# Patient Record
Sex: MALE | Race: WHITE | NOT HISPANIC OR LATINO | URBAN - METROPOLITAN AREA
[De-identification: names, ages, dates, MRNs, and addresses within clinical notes are randomized per-mention and may not be internally consistent; named-entity substitution may affect disease eponyms.]

---

## 2017-12-17 ENCOUNTER — INPATIENT (INPATIENT)
Facility: HOSPITAL | Age: 72
LOS: 5 days | Discharge: HOME | DRG: 872 | End: 2017-12-23
Attending: STUDENT IN AN ORGANIZED HEALTH CARE EDUCATION/TRAINING PROGRAM | Admitting: STUDENT IN AN ORGANIZED HEALTH CARE EDUCATION/TRAINING PROGRAM
Payer: MEDICARE

## 2017-12-17 VITALS
RESPIRATION RATE: 18 BRPM | HEIGHT: 68 IN | OXYGEN SATURATION: 93 % | HEART RATE: 98 BPM | WEIGHT: 240.08 LBS | TEMPERATURE: 100 F | DIASTOLIC BLOOD PRESSURE: 93 MMHG | SYSTOLIC BLOOD PRESSURE: 146 MMHG

## 2017-12-17 DIAGNOSIS — E11.9 TYPE 2 DIABETES MELLITUS WITHOUT COMPLICATIONS: ICD-10-CM

## 2017-12-17 DIAGNOSIS — L03.90 CELLULITIS, UNSPECIFIED: ICD-10-CM

## 2017-12-17 DIAGNOSIS — F32.9 MAJOR DEPRESSIVE DISORDER, SINGLE EPISODE, UNSPECIFIED: ICD-10-CM

## 2017-12-17 DIAGNOSIS — E87.2 ACIDOSIS: ICD-10-CM

## 2017-12-17 DIAGNOSIS — N40.0 BENIGN PROSTATIC HYPERPLASIA WITHOUT LOWER URINARY TRACT SYMPTOMS: ICD-10-CM

## 2017-12-17 DIAGNOSIS — G45.9 TRANSIENT CEREBRAL ISCHEMIC ATTACK, UNSPECIFIED: ICD-10-CM

## 2017-12-17 DIAGNOSIS — Z90.49 ACQUIRED ABSENCE OF OTHER SPECIFIED PARTS OF DIGESTIVE TRACT: Chronic | ICD-10-CM

## 2017-12-17 DIAGNOSIS — A41.9 SEPSIS, UNSPECIFIED ORGANISM: ICD-10-CM

## 2017-12-17 DIAGNOSIS — Z94.84 STEM CELLS TRANSPLANT STATUS: Chronic | ICD-10-CM

## 2017-12-17 DIAGNOSIS — C90.00 MULTIPLE MYELOMA NOT HAVING ACHIEVED REMISSION: ICD-10-CM

## 2017-12-17 DIAGNOSIS — R63.8 OTHER SYMPTOMS AND SIGNS CONCERNING FOOD AND FLUID INTAKE: ICD-10-CM

## 2017-12-17 DIAGNOSIS — I10 ESSENTIAL (PRIMARY) HYPERTENSION: ICD-10-CM

## 2017-12-17 LAB
ALBUMIN SERPL ELPH-MCNC: 4.2 G/DL — SIGNIFICANT CHANGE UP (ref 3.3–5)
ALP SERPL-CCNC: 59 U/L — SIGNIFICANT CHANGE UP (ref 40–120)
ALT FLD-CCNC: 37 U/L — SIGNIFICANT CHANGE UP (ref 10–45)
ANION GAP SERPL CALC-SCNC: 16 MMOL/L — SIGNIFICANT CHANGE UP (ref 5–17)
APPEARANCE UR: CLEAR — SIGNIFICANT CHANGE UP
APTT BLD: 28.8 SEC — SIGNIFICANT CHANGE UP (ref 27.5–37.4)
AST SERPL-CCNC: 29 U/L — SIGNIFICANT CHANGE UP (ref 10–40)
BACTERIA # UR AUTO: PRESENT /HPF
BILIRUB SERPL-MCNC: 0.6 MG/DL — SIGNIFICANT CHANGE UP (ref 0.2–1.2)
BILIRUB UR-MCNC: NEGATIVE — SIGNIFICANT CHANGE UP
BUN SERPL-MCNC: 24 MG/DL — HIGH (ref 7–23)
CALCIUM SERPL-MCNC: 8.6 MG/DL — SIGNIFICANT CHANGE UP (ref 8.4–10.5)
CHLORIDE SERPL-SCNC: 97 MMOL/L — SIGNIFICANT CHANGE UP (ref 96–108)
CO2 SERPL-SCNC: 24 MMOL/L — SIGNIFICANT CHANGE UP (ref 22–31)
COLOR SPEC: YELLOW — SIGNIFICANT CHANGE UP
CREAT SERPL-MCNC: 1.12 MG/DL — SIGNIFICANT CHANGE UP (ref 0.5–1.3)
DIFF PNL FLD: (no result)
EPI CELLS # UR: SIGNIFICANT CHANGE UP /HPF (ref 0–5)
GLUCOSE BLDC GLUCOMTR-MCNC: 129 MG/DL — HIGH (ref 70–99)
GLUCOSE SERPL-MCNC: 145 MG/DL — HIGH (ref 70–99)
GLUCOSE UR QL: NEGATIVE — SIGNIFICANT CHANGE UP
HCT VFR BLD CALC: 42.7 % — SIGNIFICANT CHANGE UP (ref 39–50)
HGB BLD-MCNC: 14.3 G/DL — SIGNIFICANT CHANGE UP (ref 13–17)
INR BLD: 1 — SIGNIFICANT CHANGE UP (ref 0.88–1.16)
KETONES UR-MCNC: NEGATIVE — SIGNIFICANT CHANGE UP
LACTATE SERPL-SCNC: 1.3 MMOL/L — SIGNIFICANT CHANGE UP (ref 0.5–2)
LACTATE SERPL-SCNC: 2.3 MMOL/L — HIGH (ref 0.5–2)
LEUKOCYTE ESTERASE UR-ACNC: NEGATIVE — SIGNIFICANT CHANGE UP
LYMPHOCYTES # BLD AUTO: 1 % — LOW (ref 13–44)
MANUAL SMEAR VERIFICATION: SIGNIFICANT CHANGE UP
MCHC RBC-ENTMCNC: 30.2 PG — SIGNIFICANT CHANGE UP (ref 27–34)
MCHC RBC-ENTMCNC: 33.5 G/DL — SIGNIFICANT CHANGE UP (ref 32–36)
MCV RBC AUTO: 90.1 FL — SIGNIFICANT CHANGE UP (ref 80–100)
MONOCYTES NFR BLD AUTO: 7 % — SIGNIFICANT CHANGE UP (ref 2–14)
NEUTROPHILS NFR BLD AUTO: 83 % — HIGH (ref 43–77)
NEUTS BAND # BLD: 9 % — SIGNIFICANT CHANGE UP
NITRITE UR-MCNC: NEGATIVE — SIGNIFICANT CHANGE UP
PH UR: 6 — SIGNIFICANT CHANGE UP (ref 5–8)
PLAT MORPH BLD: NORMAL — SIGNIFICANT CHANGE UP
PLATELET # BLD AUTO: 185 K/UL — SIGNIFICANT CHANGE UP (ref 150–400)
POTASSIUM SERPL-MCNC: 4 MMOL/L — SIGNIFICANT CHANGE UP (ref 3.5–5.3)
POTASSIUM SERPL-SCNC: 4 MMOL/L — SIGNIFICANT CHANGE UP (ref 3.5–5.3)
PROT SERPL-MCNC: 7.6 G/DL — SIGNIFICANT CHANGE UP (ref 6–8.3)
PROT UR-MCNC: NEGATIVE MG/DL — SIGNIFICANT CHANGE UP
PROTHROM AB SERPL-ACNC: 11.1 SEC — SIGNIFICANT CHANGE UP (ref 9.8–12.7)
RBC # BLD: 4.74 M/UL — SIGNIFICANT CHANGE UP (ref 4.2–5.8)
RBC # FLD: 14.1 % — SIGNIFICANT CHANGE UP (ref 10.3–16.9)
RBC BLD AUTO: NORMAL — SIGNIFICANT CHANGE UP
RBC CASTS # UR COMP ASSIST: (no result) /HPF
SODIUM SERPL-SCNC: 137 MMOL/L — SIGNIFICANT CHANGE UP (ref 135–145)
SP GR SPEC: 1.01 — SIGNIFICANT CHANGE UP (ref 1–1.03)
UROBILINOGEN FLD QL: 0.2 E.U./DL — SIGNIFICANT CHANGE UP
WBC # BLD: 9.3 K/UL — SIGNIFICANT CHANGE UP (ref 3.8–10.5)
WBC # FLD AUTO: 9.3 K/UL — SIGNIFICANT CHANGE UP (ref 3.8–10.5)
WBC UR QL: < 5 /HPF — SIGNIFICANT CHANGE UP

## 2017-12-17 PROCEDURE — 99223 1ST HOSP IP/OBS HIGH 75: CPT | Mod: GC

## 2017-12-17 PROCEDURE — 93010 ELECTROCARDIOGRAM REPORT: CPT

## 2017-12-17 PROCEDURE — 99285 EMERGENCY DEPT VISIT HI MDM: CPT | Mod: 25

## 2017-12-17 PROCEDURE — 71010: CPT | Mod: 26

## 2017-12-17 RX ORDER — METOPROLOL TARTRATE 50 MG
25 TABLET ORAL
Qty: 0 | Refills: 0 | Status: DISCONTINUED | OUTPATIENT
Start: 2017-12-17 | End: 2017-12-17

## 2017-12-17 RX ORDER — PANTOPRAZOLE SODIUM 20 MG/1
40 TABLET, DELAYED RELEASE ORAL
Qty: 0 | Refills: 0 | Status: DISCONTINUED | OUTPATIENT
Start: 2017-12-17 | End: 2017-12-23

## 2017-12-17 RX ORDER — ATORVASTATIN CALCIUM 80 MG/1
1 TABLET, FILM COATED ORAL
Qty: 0 | Refills: 0 | COMMUNITY

## 2017-12-17 RX ORDER — DICLOFENAC SODIUM 30 MG/G
0 GEL TOPICAL
Qty: 0 | Refills: 0 | COMMUNITY

## 2017-12-17 RX ORDER — SODIUM CHLORIDE 9 MG/ML
1000 INJECTION INTRAMUSCULAR; INTRAVENOUS; SUBCUTANEOUS ONCE
Qty: 0 | Refills: 0 | Status: COMPLETED | OUTPATIENT
Start: 2017-12-17 | End: 2017-12-17

## 2017-12-17 RX ORDER — ASPIRIN/CALCIUM CARB/MAGNESIUM 324 MG
81 TABLET ORAL DAILY
Qty: 0 | Refills: 0 | Status: DISCONTINUED | OUTPATIENT
Start: 2017-12-17 | End: 2017-12-23

## 2017-12-17 RX ORDER — MODAFINIL 200 MG/1
0 TABLET ORAL
Qty: 0 | Refills: 0 | COMMUNITY

## 2017-12-17 RX ORDER — LISINOPRIL 2.5 MG/1
0 TABLET ORAL
Qty: 0 | Refills: 0 | COMMUNITY

## 2017-12-17 RX ORDER — TAMSULOSIN HYDROCHLORIDE 0.4 MG/1
0.4 CAPSULE ORAL AT BEDTIME
Qty: 0 | Refills: 0 | Status: DISCONTINUED | OUTPATIENT
Start: 2017-12-17 | End: 2017-12-23

## 2017-12-17 RX ORDER — SODIUM CHLORIDE 9 MG/ML
1000 INJECTION, SOLUTION INTRAVENOUS
Qty: 0 | Refills: 0 | Status: DISCONTINUED | OUTPATIENT
Start: 2017-12-17 | End: 2017-12-23

## 2017-12-17 RX ORDER — DEXTROSE 50 % IN WATER 50 %
12.5 SYRINGE (ML) INTRAVENOUS ONCE
Qty: 0 | Refills: 0 | Status: DISCONTINUED | OUTPATIENT
Start: 2017-12-17 | End: 2017-12-23

## 2017-12-17 RX ORDER — MULTIVIT-MIN/FERROUS GLUCONATE 9 MG/15 ML
0 LIQUID (ML) ORAL
Qty: 0 | Refills: 0 | COMMUNITY

## 2017-12-17 RX ORDER — METFORMIN HYDROCHLORIDE 850 MG/1
1 TABLET ORAL
Qty: 0 | Refills: 0 | COMMUNITY

## 2017-12-17 RX ORDER — OXYBUTYNIN CHLORIDE 5 MG
5 TABLET ORAL
Qty: 0 | Refills: 0 | Status: DISCONTINUED | OUTPATIENT
Start: 2017-12-17 | End: 2017-12-23

## 2017-12-17 RX ORDER — SODIUM CHLORIDE 9 MG/ML
1000 INJECTION INTRAMUSCULAR; INTRAVENOUS; SUBCUTANEOUS
Qty: 0 | Refills: 0 | Status: DISCONTINUED | OUTPATIENT
Start: 2017-12-17 | End: 2017-12-18

## 2017-12-17 RX ORDER — DULOXETINE HYDROCHLORIDE 30 MG/1
1 CAPSULE, DELAYED RELEASE ORAL
Qty: 0 | Refills: 0 | COMMUNITY

## 2017-12-17 RX ORDER — INSULIN LISPRO 100/ML
VIAL (ML) SUBCUTANEOUS
Qty: 0 | Refills: 0 | Status: DISCONTINUED | OUTPATIENT
Start: 2017-12-17 | End: 2017-12-23

## 2017-12-17 RX ORDER — ESOMEPRAZOLE MAGNESIUM 40 MG/1
1 CAPSULE, DELAYED RELEASE ORAL
Qty: 0 | Refills: 0 | COMMUNITY

## 2017-12-17 RX ORDER — ACETAMINOPHEN 500 MG
650 TABLET ORAL ONCE
Qty: 0 | Refills: 0 | Status: COMPLETED | OUTPATIENT
Start: 2017-12-17 | End: 2017-12-17

## 2017-12-17 RX ORDER — METOPROLOL TARTRATE 50 MG
25 TABLET ORAL
Qty: 0 | Refills: 0 | Status: DISCONTINUED | OUTPATIENT
Start: 2017-12-17 | End: 2017-12-23

## 2017-12-17 RX ORDER — VANCOMYCIN HCL 1 G
500 VIAL (EA) INTRAVENOUS ONCE
Qty: 0 | Refills: 0 | Status: COMPLETED | OUTPATIENT
Start: 2017-12-17 | End: 2017-12-17

## 2017-12-17 RX ORDER — ACYCLOVIR SODIUM 500 MG
400 VIAL (EA) INTRAVENOUS
Qty: 0 | Refills: 0 | Status: DISCONTINUED | OUTPATIENT
Start: 2017-12-17 | End: 2017-12-23

## 2017-12-17 RX ORDER — DEXTROSE 50 % IN WATER 50 %
25 SYRINGE (ML) INTRAVENOUS ONCE
Qty: 0 | Refills: 0 | Status: DISCONTINUED | OUTPATIENT
Start: 2017-12-17 | End: 2017-12-23

## 2017-12-17 RX ORDER — ASPIRIN/CALCIUM CARB/MAGNESIUM 324 MG
1 TABLET ORAL
Qty: 0 | Refills: 0 | COMMUNITY

## 2017-12-17 RX ORDER — CYCLOSPORINE 0.5 MG/ML
1 EMULSION OPHTHALMIC
Qty: 0 | Refills: 0 | COMMUNITY

## 2017-12-17 RX ORDER — MODAFINIL 200 MG/1
200 TABLET ORAL
Qty: 0 | Refills: 0 | Status: DISCONTINUED | OUTPATIENT
Start: 2017-12-18 | End: 2017-12-21

## 2017-12-17 RX ORDER — FLUTICASONE PROPIONATE 220 MCG
50 AEROSOL WITH ADAPTER (GRAM) INHALATION
Qty: 0 | Refills: 0 | COMMUNITY

## 2017-12-17 RX ORDER — LORATADINE 10 MG/1
0 TABLET ORAL
Qty: 0 | Refills: 0 | COMMUNITY

## 2017-12-17 RX ORDER — GLUCAGON INJECTION, SOLUTION 0.5 MG/.1ML
1 INJECTION, SOLUTION SUBCUTANEOUS ONCE
Qty: 0 | Refills: 0 | Status: DISCONTINUED | OUTPATIENT
Start: 2017-12-17 | End: 2017-12-23

## 2017-12-17 RX ORDER — DUTASTERIDE 0.5 MG/1
1 CAPSULE, LIQUID FILLED ORAL
Qty: 0 | Refills: 0 | COMMUNITY

## 2017-12-17 RX ORDER — DULOXETINE HYDROCHLORIDE 30 MG/1
60 CAPSULE, DELAYED RELEASE ORAL DAILY
Qty: 0 | Refills: 0 | Status: DISCONTINUED | OUTPATIENT
Start: 2017-12-18 | End: 2017-12-23

## 2017-12-17 RX ORDER — CLOPIDOGREL BISULFATE 75 MG/1
1 TABLET, FILM COATED ORAL
Qty: 0 | Refills: 0 | COMMUNITY

## 2017-12-17 RX ORDER — HEPARIN SODIUM 5000 [USP'U]/ML
7500 INJECTION INTRAVENOUS; SUBCUTANEOUS EVERY 8 HOURS
Qty: 0 | Refills: 0 | Status: DISCONTINUED | OUTPATIENT
Start: 2017-12-17 | End: 2017-12-23

## 2017-12-17 RX ORDER — DARIFENACIN HYDROBROMIDE 15 MG/1
1 TABLET, EXTENDED RELEASE ORAL
Qty: 0 | Refills: 0 | COMMUNITY

## 2017-12-17 RX ORDER — LISINOPRIL 2.5 MG/1
20 TABLET ORAL DAILY
Qty: 0 | Refills: 0 | Status: DISCONTINUED | OUTPATIENT
Start: 2017-12-17 | End: 2017-12-18

## 2017-12-17 RX ORDER — TAMSULOSIN HYDROCHLORIDE 0.4 MG/1
1 CAPSULE ORAL
Qty: 0 | Refills: 0 | COMMUNITY

## 2017-12-17 RX ORDER — DEXTROSE 50 % IN WATER 50 %
1 SYRINGE (ML) INTRAVENOUS ONCE
Qty: 0 | Refills: 0 | Status: DISCONTINUED | OUTPATIENT
Start: 2017-12-17 | End: 2017-12-23

## 2017-12-17 RX ORDER — FINASTERIDE 5 MG/1
5 TABLET, FILM COATED ORAL DAILY
Qty: 0 | Refills: 0 | Status: DISCONTINUED | OUTPATIENT
Start: 2017-12-18 | End: 2017-12-23

## 2017-12-17 RX ORDER — VANCOMYCIN HCL 1 G
1000 VIAL (EA) INTRAVENOUS ONCE
Qty: 0 | Refills: 0 | Status: COMPLETED | OUTPATIENT
Start: 2017-12-17 | End: 2017-12-17

## 2017-12-17 RX ORDER — ATORVASTATIN CALCIUM 80 MG/1
40 TABLET, FILM COATED ORAL AT BEDTIME
Qty: 0 | Refills: 0 | Status: DISCONTINUED | OUTPATIENT
Start: 2017-12-17 | End: 2017-12-23

## 2017-12-17 RX ORDER — CLOPIDOGREL BISULFATE 75 MG/1
75 TABLET, FILM COATED ORAL DAILY
Qty: 0 | Refills: 0 | Status: DISCONTINUED | OUTPATIENT
Start: 2017-12-17 | End: 2017-12-23

## 2017-12-17 RX ORDER — ACYCLOVIR SODIUM 500 MG
1 VIAL (EA) INTRAVENOUS
Qty: 0 | Refills: 0 | COMMUNITY

## 2017-12-17 RX ORDER — METOPROLOL TARTRATE 50 MG
1 TABLET ORAL
Qty: 0 | Refills: 0 | COMMUNITY

## 2017-12-17 RX ORDER — VANCOMYCIN HCL 1 G
1500 VIAL (EA) INTRAVENOUS EVERY 24 HOURS
Qty: 0 | Refills: 0 | Status: DISCONTINUED | OUTPATIENT
Start: 2017-12-18 | End: 2017-12-21

## 2017-12-17 RX ORDER — MIRABEGRON 50 MG/1
1 TABLET, EXTENDED RELEASE ORAL
Qty: 0 | Refills: 0 | COMMUNITY

## 2017-12-17 RX ADMIN — Medication 200 MILLIGRAM(S): at 23:00

## 2017-12-17 RX ADMIN — Medication 400 MILLIGRAM(S): at 23:00

## 2017-12-17 RX ADMIN — SODIUM CHLORIDE 1000 MILLILITER(S): 9 INJECTION INTRAMUSCULAR; INTRAVENOUS; SUBCUTANEOUS at 19:10

## 2017-12-17 RX ADMIN — Medication 250 MILLIGRAM(S): at 15:58

## 2017-12-17 RX ADMIN — Medication 100 MILLIGRAM(S): at 22:23

## 2017-12-17 RX ADMIN — TAMSULOSIN HYDROCHLORIDE 0.4 MILLIGRAM(S): 0.4 CAPSULE ORAL at 22:23

## 2017-12-17 RX ADMIN — SODIUM CHLORIDE 2000 MILLILITER(S): 9 INJECTION INTRAMUSCULAR; INTRAVENOUS; SUBCUTANEOUS at 20:44

## 2017-12-17 RX ADMIN — SODIUM CHLORIDE 1000 MILLILITER(S): 9 INJECTION INTRAMUSCULAR; INTRAVENOUS; SUBCUTANEOUS at 15:58

## 2017-12-17 RX ADMIN — ATORVASTATIN CALCIUM 40 MILLIGRAM(S): 80 TABLET, FILM COATED ORAL at 22:23

## 2017-12-17 RX ADMIN — Medication 650 MILLIGRAM(S): at 19:10

## 2017-12-17 NOTE — H&P ADULT - NSHPLABSRESULTS_GEN_ALL_CORE
.  LABS:                         14.3   9.3   )-----------( 185      ( 17 Dec 2017 15:31 )             42.7     12-17    137  |  97  |  24<H>  ----------------------------<  145<H>  4.0   |  24  |  1.12    Ca    8.6      17 Dec 2017 15:31    TPro  7.6  /  Alb  4.2  /  TBili  0.6  /  DBili  x   /  AST  29  /  ALT  37  /  AlkPhos  59  12-17    PT/INR - ( 17 Dec 2017 15:31 )   PT: 11.1 sec;   INR: 1.00          PTT - ( 17 Dec 2017 15:31 )  PTT:28.8 sec      Lactate, Blood: 2.3 mmoL/L (12-17 @ 15:32)    RADIOLOGY, EKG & ADDITIONAL TESTS: Reviewed.

## 2017-12-17 NOTE — PATIENT PROFILE ADULT. - VISION (WITH CORRECTIVE LENSES IF THE PATIENT USUALLY WEARS THEM):
Partially impaired: cannot see medication labels or newsprint, but can see obstacles in path, and the surrounding layout; can count fingers at arm's length/Double vision
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
## 2017-12-17 NOTE — H&P ADULT - PROBLEM SELECTOR PLAN 4
Pt w/ h/o MM s/p chemo >12 years ago. Before treatment, immunosuppressed infections including PCP. Currently followed in [place] at [facility].   - c/w Acyclovir 400mg BID  - continue to monitor

## 2017-12-17 NOTE — H&P ADULT - PROBLEM SELECTOR PLAN 6
Patient with reported HTN at home, on MTP 25mg BID, triamterene HCTZ 37.5/25mg daily. sBP 141-162. Last dose taken this AM.   - c/w MTP 25mg BID  - c/w lisinopril 20mg BID    # HLD   - c/w atorvostatin 40mg   - Patient with reported HTN at home, on MTP 25mg BID, triamterene HCTZ 37.5/25mg daily. sBP 141-162. Last dose taken this AM.   - c/w MTP 25mg BID  - c/w lisinopril 20mg BID    # HLD   - c/w atorvastatin 40mg Patient with reported HTN at home, on MTP 25mg BID, triamterene HCTZ 37.5/25mg daily. sBP 141-162. Last dose taken this AM.   - c/w MTP 25mg BID  - c/w lisinopril 20mg BID  -holding hctz-triamterene in setting of dry MM   # HLD   - c/w atorvastatin 40mg

## 2017-12-17 NOTE — H&P ADULT - PROBLEM SELECTOR PLAN 9
Known BPH.  - c/w Tamsulosin 0.4mg, Dutasteride 0.5mg PO daily     # urge incontinence: home darifenacin ER 7.5mg PO d and Myrbetriq ER 25mg held Known BPH.  - c/w Tamsulosin 0.4mg, Dutasteride 0.5mg PO daily     # urge incontinence: c/w home darifenacin ER 7.5mg PO d and Myrbetriq ER 25mg

## 2017-12-17 NOTE — H&P ADULT - PROBLEM SELECTOR PLAN 1
Pt w/ sepsis criteria with T 101.6F HR 93 Lactate 2.3 likely 2/2 to cellulitis, pt w/o h/o MRSA cellulitis c/b sepsis. Blood Cultures collected, pending. Given recent cough, CX for r/o PNA.   - s/p Vanc 1g ED - c/w Abx  - IVF resuscitation with goal 30cc/Kg  - f/u CXR, UA, BCx  - Tylenol 650mg PRN for T>38C  - continue to monitor Pt w/ sepsis criteria with T 101.6F HR 93 Lactate 2.3 likely 2/2 to cellulitis, pt w/o h/o MRSA cellulitis c/b sepsis. Blood Cultures collected, pending. Given recent cough, CX for r/o PNA.   - s/p Vanc 1g ED - given weight based dosing, give 500mg IVP now and then plan for Vanc 1.5mg IVP q24 with trough before 4th dose - ensure to run slowly with concern for red man syndrome.   - IVF resuscitation with goal 30cc/Kg  - f/u CXR, UA, BCx  - Tylenol 650mg PRN for T>38C  - continue to monitor

## 2017-12-17 NOTE — H&P ADULT - PROBLEM SELECTOR PLAN 5
Pt with h/o TIA in the past; w/u per pt and family was negative, no atrial fibrillation, PFO, CAD, stenosis - unknown cause, attributed to MM. No h/o stroke or residual deficits.   - c/w ASA 81mg and Plavix 75mg POd

## 2017-12-17 NOTE — ED PROVIDER NOTE - OBJECTIVE STATEMENT
73 y/o m with extensive PMH including multiple myeloma s/p stem cell transplant x 2 (last 2000), HTN, DM, presents to ED with weakness, subjective fever, chills, inability to stand secondary to weakness and left leg redness.  Pt has hx of multiple prior cellulitis infections requiring antibiotics and admission (pt from [place]).  No chest pain, shortness of breath.  No trauma.

## 2017-12-17 NOTE — H&P ADULT - NSHPREVIEWOFSYSTEMS_GEN_ALL_CORE
CONSTITUTIONAL: weakness, fevers or chills SEE HPI  EYES/ENT: No visual changes;  No vertigo or throat pain   NECK: No pain or stiffness  RESPIRATORY: + cough, - wheezing, hemoptysis; No shortness of breath  CARDIOVASCULAR: No chest pain or palpitations  GASTROINTESTINAL: No abdominal or epigastric pain. + nausea, vomiting, or hematemesis; No diarrhea or constipation. No melena or hematochezia.  GENITOURINARY: No dysuria, frequency or hematuria  NEUROLOGICAL: No numbness or weakness  SKIN: No itching, burning, rashes, or lesions   All other review of systems is negative unless indicated above.

## 2017-12-17 NOTE — H&P ADULT - PROBLEM SELECTOR PLAN 8
Patient with DMII (reported A1c of 6) but c/b diabetic neuropathy. Home metformin HCl 500mg BID held on admission  - Insulin sliding scale  - Consistent carb diabetic diet Patient with DMII (reported A1c of 6) but c/b diabetic neuropathy. Home metformin HCl 500mg BID held on admission  - Insulin sliding scale  - Consistent carb diabetic diet    # Obstructive Sleep Apnea (EDELMIRA): pt w/ CPAP at night  - CPAP

## 2017-12-17 NOTE — ED ADULT NURSE NOTE - OBJECTIVE STATEMENT
71 y/o male c/o lightheadedness, weakness, general malaise, nausea, vomiting, and left ankle pain since last night. pt recently traveled from [facility] by plane yesterday. denies chest pain, sob. PMH multiple myeloma, TIA's, DM. NAD, VSS, EKG done. left ankle appears red, inflamed, hot to touch, tracking up the left calf. pt has battery in his back for neuropathy.

## 2017-12-17 NOTE — H&P ADULT - NSHPPHYSICALEXAM_GEN_ALL_CORE
.  VITAL SIGNS:  T(F): 101.6 (12-17-17 @ 18:22), Max: 101.6 (12-17-17 @ 18:22)  HR: 93 (12-17-17 @ 18:22) (93 - 98)  BP: 162/90 (12-17-17 @ 18:22) (141/78 - 162/90)  BP(mean): --  RR: 18 (12-17-17 @ 18:22) (18 - 18)  SpO2: 95% (12-17-17 @ 18:22) (93% - 96%)    PHYSICAL EXAM:    Constitutional: NAD, patient lying in bed comfortable  HEENT: NC/AT, PERRL, EOMI, anicteric sclera, no nasal discharge; uvula midline, no oropharyngeal erythema or exudates; dry MM  Neck: Supple; no JVD  Respiratory: CTA B/L; no W/R/R, no retractions. Clear to auscultation bilateral.   Cardiac: +S1/S2; RRR; no M/R/G; PMI non-displaced  Gastrointestinal: soft, NT/ND; no rebound or guarding; +BSx4. Well healed surgical scar. no suprapubic tenderness  Extremities: WWP, bilaterally, there are multiple abrasions, poorly healed lesions. The L LE has one 5cm, nonpurulent, scabbed cut with surrounding erythema, extending up to below the knee, non-tender to palpation but warm to touch. decreased sensation in both LE bilaterally. Limited ROM due to weakness (3/5).  Vascular: 2+ radial, DP/PT pulses B/L  Neurologic: AAOx3; CNII-XII grossly intact; no focal deficits

## 2017-12-17 NOTE — H&P ADULT - ATTENDING COMMENTS
patient seen and examined  reviewed vs, labs, CXR  agree w/ PE findings as above w/ additions/ exceptions/ pertinent findings: pt is morbidly obese, dry MM, unable to assess JVD to neck girth; there is a tender abrasion at the LLExt above the lateral malleolus, w/ surrounding tender erythema, superior and medial circumferential borders marked as it is difficult to differentiate erythema from rest of skin on the lateral and inferior aspect of wound ; no drainage from wound; no calf tenderness b/l ; pt w/ dry feet b/l    1. left lower ext cellulitis/ severe  sepsis : c/w weight based vanco, run slowly to avoid red man syndrome  2.   3.   4. patient seen and examined  reviewed vs, labs, CXR    pt traveling to [place] from [place] to celebrate relative's birthday; pt arrived to [facility] on 12/16 to fly back to [place] on 12/26. sustained abrasion at left lower ext , w/ progressing erythema and tenderness now w/ systemic signs ; reports runny nose and cough since flight.     agree w/ PE findings as above w/ additions/ exceptions/ pertinent findings: pt is morbidly obese, dry MM, unable to assess JVD to neck girth; there is a tender abrasion at the LLExt above the lateral malleolus, w/ surrounding tender erythema, superior and medial circumferential borders marked as it is difficult to differentiate erythema from rest of skin on the lateral and inferior aspect of wound ; no drainage from wound; no calf tenderness b/l ; pt w/ dry feet b/l    1. left lower ext cellulitis/ severe  sepsis : c/w weight based vanco, run slowly (hx of red man syndrome); IVFs overnight, monitor UO  2. HTN : c/w metoprolol and lisinopril as pt w/ elevated BP, w/ hold parameters; holding Triamterene-hctz in setting of patient appearing dry on exam  3. cough w/ runny nose: checking RVP   4. DM: plan as above   5. Heparin SQ PPX

## 2017-12-17 NOTE — H&P ADULT - HISTORY OF PRESENT ILLNESS
72yM with PMH of MM s/p chemo (>15yo), DM (A1c6) c/b peripheral neuropathy MRSA cellulitis, TIA x2 on ASA/Plx, HTN, HLD, BPH w/ urge incontinence presenting from home with complaints of worsening L LE swelling, redness, pain and generalized weakness. Pt and spouse, primary care taker, traveling from [place] 2/2 to [facility] 2 days ago. At that time, spouse noted non-healing, well demarcated cut which "happens all the time." Topical bacitracin, bandage applied, + clear drainage. Denies fever, chills, systemic symptoms. Yesterday, pt began o fell "generally sick," described as generalized weakness, tiredness, nausea and had one episode of vomiting this AM w/ noted worsening of redness around L LE, similar to prior cellulitis episodes. Pt has been hospitalized >5x for cellulitis, on number occasions MRSA. Pt sees PCP and podiatrist in [place] regularly for DM foot care and maintenance. Pt felt "feverish" this AM but did not take anything for it-- the worsening presentation of LE cellulitis motivated them to present to ED. Of note, pt endorsed non-productive cough x2days. Denies chest pain, palpitations, occasional HA, No SOB, AYALA, able to ambulate w/ assistance at baseline. No new numbness or focal neurologic symptoms, no difficulty word finding or speaking.     In the ED: T37.6 HR 98 146/93 RR 18 (93%). Patient given 1L NS and 1g Vanc IV. Shortly after admission while in [facility], patient spike a fever T 101.6 (o) 162/90 HR 93 RR 18 (95%) on RA with elevated Lactate 2.3,, meeting sepsis criteria. 2 additional L were ordered, including CXR, UA, BCx were collected on admission. Patient was transferred to [facility] for further management.

## 2017-12-17 NOTE — H&P ADULT - PMH
Anxiety    Arthritis    BPH (benign prostatic hyperplasia)    Depression    Diabetes    Hyperlipidemia    Hypertension    Multiple myeloma  s/p chemotherapy  Skin abnormalities    TIA (transient ischemic attack)

## 2017-12-17 NOTE — ED PROVIDER NOTE - MEDICAL DECISION MAKING DETAILS
pt with erythema to left foot with hx of DM and immunocompromised state (MM with stem cell transplant) - pt also weak - elevated LA on labs otherwise ok, pt with cellulitis to left foot with lymphangitis, has had multiple prior admissions for cellulitis in [place] because of hx of worsening cellulitis on oral antibiotics

## 2017-12-17 NOTE — H&P ADULT - PROBLEM SELECTOR PLAN 7
Patient with h/o depression on duloxetine 60mg BID, Lyrica 100mg TID, duloxetine 60mg BID  - c/w home medications and continue to monitor    #GERD: h/o GERD controlled with esomeprazole 40mg  - c/w pantoprazole 40mg PO d Patient with h/o depression on duloxetine 60mg BID, Lyrica 100mg TID  - c/w home medications and continue to monitor  - c/w modafinil 200mg BID daily     #GERD: h/o GERD controlled with esomeprazole 40mg  - c/w pantoprazole 40mg PO d

## 2017-12-17 NOTE — H&P ADULT - PROBLEM SELECTOR PLAN 2
Pt with clinical h/o MRSA cellulitis c/b sepsis >5 times. Pt w/ DM neuropathy and multiple lesions on the LE. S/S of L LE cellulitis on exam, non-purulent.   - c/w Vanc  - continue to monitor and consider wound consult Pt with clinical h/o MRSA cellulitis c/b sepsis >5 times. Pt w/ DM neuropathy and multiple lesions on the LE. S/S of L LE cellulitis on exam, non-purulent.   - s/p Vanc 1g ED - given weight based dosing, give 500mg IVP now and then plan for Vanc 1.5mg IVP q24 with trough before 4th dose - ensure to run slowly with concern for red man syndrome  - continue to monitor and consider wound consult

## 2017-12-17 NOTE — H&P ADULT - ASSESSMENT
2yM with PMH of MM s/p chemo (>15yo), DM (A1c6) c/b peripheral neuropathy MRSA cellulitis, TIA x2 on ASA/Plx, HTN, HLD, BPH w/ urge incontinence presenting from home with complaints of worsening L LE swelling, redness, pain and generalized weakness with concern for cellutlitis. Patient was became septic and being treated with IV Abx (Vanc) and fluid resuscitation. 72yM with PMH of MM s/p chemo (>13yo), DM (A1c6) c/b peripheral neuropathy MRSA cellulitis, TIA x2 on ASA/Plx, HTN, HLD, BPH w/ urge incontinence presenting from home with complaints of worsening L LE swelling, redness, pain and generalized weakness with concern for cellutlitis. Patient became septic and being treated with IV Abx (Vanc) and fluid resuscitation.

## 2017-12-18 DIAGNOSIS — N40.1 BENIGN PROSTATIC HYPERPLASIA WITH LOWER URINARY TRACT SYMPTOMS: ICD-10-CM

## 2017-12-18 DIAGNOSIS — I10 ESSENTIAL (PRIMARY) HYPERTENSION: ICD-10-CM

## 2017-12-18 DIAGNOSIS — E11.40 TYPE 2 DIABETES MELLITUS WITH DIABETIC NEUROPATHY, UNSPECIFIED: ICD-10-CM

## 2017-12-18 DIAGNOSIS — Z85.79 PERSONAL HISTORY OF OTHER MALIGNANT NEOPLASMS OF LYMPHOID, HEMATOPOIETIC AND RELATED TISSUES: ICD-10-CM

## 2017-12-18 DIAGNOSIS — G47.33 OBSTRUCTIVE SLEEP APNEA (ADULT) (PEDIATRIC): ICD-10-CM

## 2017-12-18 DIAGNOSIS — D69.6 THROMBOCYTOPENIA, UNSPECIFIED: ICD-10-CM

## 2017-12-18 DIAGNOSIS — E87.6 HYPOKALEMIA: ICD-10-CM

## 2017-12-18 DIAGNOSIS — Z86.73 PERSONAL HISTORY OF TRANSIENT ISCHEMIC ATTACK (TIA), AND CEREBRAL INFARCTION WITHOUT RESIDUAL DEFICITS: ICD-10-CM

## 2017-12-18 DIAGNOSIS — G89.29 OTHER CHRONIC PAIN: ICD-10-CM

## 2017-12-18 LAB
ANION GAP SERPL CALC-SCNC: 12 MMOL/L — SIGNIFICANT CHANGE UP (ref 5–17)
BUN SERPL-MCNC: 17 MG/DL — SIGNIFICANT CHANGE UP (ref 7–23)
CALCIUM SERPL-MCNC: 7.7 MG/DL — LOW (ref 8.4–10.5)
CHLORIDE SERPL-SCNC: 101 MMOL/L — SIGNIFICANT CHANGE UP (ref 96–108)
CO2 SERPL-SCNC: 22 MMOL/L — SIGNIFICANT CHANGE UP (ref 22–31)
CREAT SERPL-MCNC: 1.04 MG/DL — SIGNIFICANT CHANGE UP (ref 0.5–1.3)
CULTURE RESULTS: SIGNIFICANT CHANGE UP
GLUCOSE BLDC GLUCOMTR-MCNC: 120 MG/DL — HIGH (ref 70–99)
GLUCOSE BLDC GLUCOMTR-MCNC: 137 MG/DL — HIGH (ref 70–99)
GLUCOSE BLDC GLUCOMTR-MCNC: 159 MG/DL — HIGH (ref 70–99)
GLUCOSE BLDC GLUCOMTR-MCNC: 173 MG/DL — HIGH (ref 70–99)
GLUCOSE SERPL-MCNC: 129 MG/DL — HIGH (ref 70–99)
HBA1C BLD-MCNC: 6.2 % — HIGH (ref 4–5.6)
HCT VFR BLD CALC: 38.3 % — LOW (ref 39–50)
HGB BLD-MCNC: 12.4 G/DL — LOW (ref 13–17)
MAGNESIUM SERPL-MCNC: 1.8 MG/DL — SIGNIFICANT CHANGE UP (ref 1.6–2.6)
MCHC RBC-ENTMCNC: 29.7 PG — SIGNIFICANT CHANGE UP (ref 27–34)
MCHC RBC-ENTMCNC: 32.4 G/DL — SIGNIFICANT CHANGE UP (ref 32–36)
MCV RBC AUTO: 91.8 FL — SIGNIFICANT CHANGE UP (ref 80–100)
PLATELET # BLD AUTO: 142 K/UL — LOW (ref 150–400)
POTASSIUM SERPL-MCNC: 3.4 MMOL/L — LOW (ref 3.5–5.3)
POTASSIUM SERPL-SCNC: 3.4 MMOL/L — LOW (ref 3.5–5.3)
RAPID RVP RESULT: SIGNIFICANT CHANGE UP
RBC # BLD: 4.17 M/UL — LOW (ref 4.2–5.8)
RBC # FLD: 14.4 % — SIGNIFICANT CHANGE UP (ref 10.3–16.9)
SODIUM SERPL-SCNC: 135 MMOL/L — SIGNIFICANT CHANGE UP (ref 135–145)
SPECIMEN SOURCE: SIGNIFICANT CHANGE UP
WBC # BLD: 4.8 K/UL — SIGNIFICANT CHANGE UP (ref 3.8–10.5)
WBC # FLD AUTO: 4.8 K/UL — SIGNIFICANT CHANGE UP (ref 3.8–10.5)

## 2017-12-18 PROCEDURE — 99233 SBSQ HOSP IP/OBS HIGH 50: CPT

## 2017-12-18 RX ORDER — POTASSIUM CHLORIDE 20 MEQ
40 PACKET (EA) ORAL ONCE
Qty: 0 | Refills: 0 | Status: COMPLETED | OUTPATIENT
Start: 2017-12-18 | End: 2017-12-18

## 2017-12-18 RX ORDER — LISINOPRIL 2.5 MG/1
20 TABLET ORAL
Qty: 0 | Refills: 0 | Status: DISCONTINUED | OUTPATIENT
Start: 2017-12-18 | End: 2017-12-23

## 2017-12-18 RX ORDER — MAGNESIUM SULFATE 500 MG/ML
2 VIAL (ML) INJECTION ONCE
Qty: 0 | Refills: 0 | Status: COMPLETED | OUTPATIENT
Start: 2017-12-18 | End: 2017-12-18

## 2017-12-18 RX ORDER — TRIAMTERENE/HYDROCHLOROTHIAZID 75 MG-50MG
1 TABLET ORAL DAILY
Qty: 0 | Refills: 0 | Status: DISCONTINUED | OUTPATIENT
Start: 2017-12-18 | End: 2017-12-23

## 2017-12-18 RX ORDER — ACETAMINOPHEN 500 MG
650 TABLET ORAL ONCE
Qty: 0 | Refills: 0 | Status: COMPLETED | OUTPATIENT
Start: 2017-12-18 | End: 2017-12-18

## 2017-12-18 RX ADMIN — CLOPIDOGREL BISULFATE 75 MILLIGRAM(S): 75 TABLET, FILM COATED ORAL at 11:55

## 2017-12-18 RX ADMIN — LISINOPRIL 20 MILLIGRAM(S): 2.5 TABLET ORAL at 07:41

## 2017-12-18 RX ADMIN — ATORVASTATIN CALCIUM 40 MILLIGRAM(S): 80 TABLET, FILM COATED ORAL at 22:56

## 2017-12-18 RX ADMIN — Medication 650 MILLIGRAM(S): at 22:56

## 2017-12-18 RX ADMIN — Medication 25 MILLIGRAM(S): at 18:00

## 2017-12-18 RX ADMIN — Medication 200 MILLIGRAM(S): at 22:56

## 2017-12-18 RX ADMIN — HEPARIN SODIUM 7500 UNIT(S): 5000 INJECTION INTRAVENOUS; SUBCUTANEOUS at 07:41

## 2017-12-18 RX ADMIN — Medication 50 GRAM(S): at 11:54

## 2017-12-18 RX ADMIN — DULOXETINE HYDROCHLORIDE 60 MILLIGRAM(S): 30 CAPSULE, DELAYED RELEASE ORAL at 11:56

## 2017-12-18 RX ADMIN — Medication 1 CAPSULE(S): at 12:49

## 2017-12-18 RX ADMIN — MODAFINIL 200 MILLIGRAM(S): 200 TABLET ORAL at 19:35

## 2017-12-18 RX ADMIN — Medication 1: at 12:44

## 2017-12-18 RX ADMIN — Medication 300 MILLIGRAM(S): at 20:47

## 2017-12-18 RX ADMIN — Medication 400 MILLIGRAM(S): at 18:00

## 2017-12-18 RX ADMIN — FINASTERIDE 5 MILLIGRAM(S): 5 TABLET, FILM COATED ORAL at 11:56

## 2017-12-18 RX ADMIN — HEPARIN SODIUM 7500 UNIT(S): 5000 INJECTION INTRAVENOUS; SUBCUTANEOUS at 22:57

## 2017-12-18 RX ADMIN — Medication 400 MILLIGRAM(S): at 07:41

## 2017-12-18 RX ADMIN — Medication 25 MILLIGRAM(S): at 07:41

## 2017-12-18 RX ADMIN — HEPARIN SODIUM 7500 UNIT(S): 5000 INJECTION INTRAVENOUS; SUBCUTANEOUS at 13:12

## 2017-12-18 RX ADMIN — MODAFINIL 200 MILLIGRAM(S): 200 TABLET ORAL at 07:41

## 2017-12-18 RX ADMIN — Medication 5 MILLIGRAM(S): at 07:41

## 2017-12-18 RX ADMIN — Medication 100 MILLIGRAM(S): at 12:43

## 2017-12-18 RX ADMIN — Medication 81 MILLIGRAM(S): at 11:56

## 2017-12-18 RX ADMIN — Medication 5 MILLIGRAM(S): at 18:00

## 2017-12-18 RX ADMIN — Medication 40 MILLIEQUIVALENT(S): at 11:57

## 2017-12-18 RX ADMIN — PANTOPRAZOLE SODIUM 40 MILLIGRAM(S): 20 TABLET, DELAYED RELEASE ORAL at 07:41

## 2017-12-18 RX ADMIN — TAMSULOSIN HYDROCHLORIDE 0.4 MILLIGRAM(S): 0.4 CAPSULE ORAL at 22:56

## 2017-12-18 RX ADMIN — LISINOPRIL 20 MILLIGRAM(S): 2.5 TABLET ORAL at 18:00

## 2017-12-18 NOTE — DISCHARGE NOTE ADULT - MEDICATION SUMMARY - MEDICATIONS TO TAKE
I will START or STAY ON the medications listed below when I get home from the hospital:    dutasteride 0.5 mg oral capsule  -- 1 cap(s) by mouth once a day  -- Indication: For BPH (benign prostatic hyperplasia)    Aspir 81 oral delayed release tablet  -- 1 tab(s) by mouth once a day  -- Indication: For prevention    lisinopril 20 mg oral tablet  -- orally 2 times a day  -- Indication: For HTN    tamsulosin 0.4 mg oral capsule  -- 1 cap(s) by mouth once a day  -- Indication: For BPH (benign prostatic hyperplasia)    Lyrica 100 mg oral capsule  -- orally 3 times a day  -- Indication: For NEUROPATHY    DULoxetine 60 mg oral delayed release capsule  -- 1 cap(s) by mouth once a day  -- Indication: For Depression    metFORMIN 500 mg oral tablet  -- 1 tab(s) by mouth 2 times a day  -- Indication: For Diabetes    Claritin  -- Indication: For Allergies    atorvastatin 40 mg oral tablet  -- 1 tab(s) by mouth once a day  -- Indication: For HLD    triamterene-hydrochlorothiazide 37.5mg-25mg oral capsule  -- 1 cap(s) by mouth once a day  -- Indication: For HTN    clopidogrel 75 mg oral tablet  -- 1 tab(s) by mouth once a day  -- Indication: For prevention    acyclovir 400 mg oral tablet  -- 1 tab(s) by mouth 2 times a day  -- Indication: For Multiple myeloma    metoprolol tartrate 25 mg oral tablet  -- 1 tab(s) by mouth 2 times a day  -- Indication: For HTN    amLODIPine 5 mg oral tablet  -- 1 tab(s) by mouth once a day  -- Indication: For HTN    modafinil 200 mg oral tablet  -- orally 2 times a day  -- Indication: For ALERTNESS    Pennsaid 2% topical solution  -- Indication: For Skin abnormalities    Restasis 0.05% ophthalmic emulsion  -- 1 drop(s) to each affected eye every 12 hours  -- Indication: For Eye abnormality    esomeprazole 40 mg oral delayed release capsule  -- 1 cap(s) by mouth once a day  -- Indication: For gerd    Levaquin 750 mg oral tablet  -- 1 tab(s) by mouth once a day   -- Avoid prolonged or excessive exposure to direct and/or artificial sunlight while taking this medication.  Do not take dairy products, antacids, or iron preparations within one hour of this medication.  Finish all this medication unless otherwise directed by prescriber.  May cause drowsiness or dizziness.  Medication should be taken with plenty of water.    -- Indication: For Bacteremia    fluticasone propionate  -- 50 milligram(s) inhaled  -- Indication: For Allergies    Myrbetriq 25 mg oral tablet, extended release  -- 1 tab(s) by mouth once a day  -- Indication: For Spastic bladder    darifenacin 7.5 mg oral tablet, extended release  -- 1 tab(s) by mouth once a day  -- Indication: For Spastic bladder    Centrum  -- Indication: For prevention

## 2017-12-18 NOTE — PROGRESS NOTE ADULT - PROBLEM SELECTOR PLAN 6
Patient with reported HTN at home, on MTP 25mg BID, triamterene HCTZ 37.5/25mg daily. sBP 141-162. Last dose taken this AM.   - c/w MTP 25mg BID  - c/w lisinopril 20mg BID  - holding hctz-triamterene in setting of dry MM     # HLD   - c/w atorvastatin 40mg

## 2017-12-18 NOTE — PROGRESS NOTE ADULT - PROBLEM SELECTOR PLAN 2
Pt with clinical h/o MRSA cellulitis c/b sepsis >5 times. Pt w/ DM neuropathy and multiple lesions on the LE. S/S of L LE cellulitis on exam, non-purulent.   - c/w Vanc 1.5g IVP q24 with trough before 4th dose - ensure to run slowly with concern for red man syndrome.   - continue to monitor and consider wound consult

## 2017-12-18 NOTE — DISCHARGE NOTE ADULT - PATIENT PORTAL LINK FT
“You can access the [facility] Patient Portal, offered by [facility], by registering with the following website: [email or web address]”

## 2017-12-18 NOTE — DISCHARGE NOTE ADULT - SECONDARY DIAGNOSIS.
Severe sepsis TIA (transient ischemic attack) Hypertension BPH (benign prostatic hyperplasia) Multiple myeloma Cellulitis of left lower extremity

## 2017-12-18 NOTE — PROGRESS NOTE ADULT - PROBLEM SELECTOR PLAN 3
Patient with Lactate of 2.3 on admission, since RESOLVED (1.3) s/p 3L Normal Saline. Normal anion gap.

## 2017-12-18 NOTE — PROGRESS NOTE ADULT - PROBLEM SELECTOR PLAN 1
Pt w/ severe sepsis criteria (since resolved) with T 101.6F HR 93 Lactate 2.3 likely 2/2 to cellulitis, pt w/ h/o MRSA cellulitis c/b sepsis. Blood Cultures collected, pending. CXR no acute infil UA negative BCx: NGTD  - c/w Vanc 1.5g IVP q24 with trough before 4th dose - ensure to run slowly with concern for red man syndrome.   - s/p IVF resuscitation with goal 30cc/Kg, now on 100cc/Hr in setting decreased PO; consider discontinuing maintenance once improved PO intake.   - Tylenol 650mg PRN for T>38C  - continue to monitor

## 2017-12-18 NOTE — PROGRESS NOTE ADULT - SUBJECTIVE AND OBJECTIVE BOX
OVERNIGHT EVENTS: SHARON    SUBJECTIVE / INTERVAL HPI: Patient seen and examined at bedside.     VITAL SIGNS:  Vital Signs Last 24 Hrs  T(C): 36.7 (18 Dec 2017 05:00), Max: 38.7 (17 Dec 2017 18:22)  T(F): 98.1 (18 Dec 2017 05:00), Max: 101.6 (17 Dec 2017 18:22)  HR: 93 (18 Dec 2017 06:58) (86 - 98)  BP: 122/73 (18 Dec 2017 05:00) (119/72 - 162/90)  BP(mean): 95 (17 Dec 2017 20:01) (95 - 95)  RR: 18 (18 Dec 2017 06:58) (18 - 20)  SpO2: 95% (18 Dec 2017 06:58) (93% - 98%)    PHYSICAL EXAM:    General: WDWN  HEENT: NC/AT; PERRL, anicteric sclera; MMM  Neck: supple  Cardiovascular: +S1/S2; RRR  Respiratory: CTA B/L; no W/R/R  Gastrointestinal: soft, NT/ND; +BSx4  Extremities: WWP; no edema, clubbing or cyanosis  Vascular: 2+ radial, DP/PT pulses B/L  Neurological: AAOx3; no focal deficits    MEDICATIONS:  MEDICATIONS  (STANDING):  acyclovir   Tablet 400 milliGRAM(s) Oral two times a day  aspirin enteric coated 81 milliGRAM(s) Oral daily  atorvastatin 40 milliGRAM(s) Oral at bedtime  clopidogrel Tablet 75 milliGRAM(s) Oral daily  dextrose 5%. 1000 milliLiter(s) (50 mL/Hr) IV Continuous <Continuous>  dextrose 50% Injectable 12.5 Gram(s) IV Push once  dextrose 50% Injectable 25 Gram(s) IV Push once  dextrose 50% Injectable 25 Gram(s) IV Push once  DULoxetine 60 milliGRAM(s) Oral daily  finasteride 5 milliGRAM(s) Oral daily  heparin  Injectable 7500 Unit(s) SubCutaneous every 8 hours  insulin lispro (HumaLOG) corrective regimen sliding scale   SubCutaneous Before meals and at bedtime  lisinopril 20 milliGRAM(s) Oral daily  metoprolol     tartrate 25 milliGRAM(s) Oral two times a day  modafinil 200 milliGRAM(s) Oral two times a day  oxybutynin 5 milliGRAM(s) Oral two times a day  pantoprazole    Tablet 40 milliGRAM(s) Oral before breakfast  pregabalin 100 milliGRAM(s) Oral daily  pregabalin 200 milliGRAM(s) Oral at bedtime  sodium chloride 0.9%. 1000 milliLiter(s) (100 mL/Hr) IV Continuous <Continuous>  tamsulosin 0.4 milliGRAM(s) Oral at bedtime  vancomycin  IVPB 1500 milliGRAM(s) IV Intermittent every 24 hours    MEDICATIONS  (PRN):  dextrose Gel 1 Dose(s) Oral once PRN Blood Glucose LESS THAN 70 milliGRAM(s)/deciliter  glucagon  Injectable 1 milliGRAM(s) IntraMuscular once PRN Glucose LESS THAN 70 milligrams/deciliter      ALLERGIES:  Allergies    Bactrim (Headache)  Sular (Other)    Intolerances    Cipro (Headache)  vancomycin (Red [place] Synd)      LABS:                        14.3   9.3   )-----------( 185      ( 17 Dec 2017 15:31 )             42.7     12-    137  |  97  |  24<H>  ----------------------------<  145<H>  4.0   |  24  |  1.12    Ca    8.6      17 Dec 2017 15:31    TPro  7.6  /  Alb  4.2  /  TBili  0.6  /  DBili  x   /  AST  29  /  ALT  37  /  AlkPhos  59  12-17    PT/INR - ( 17 Dec 2017 15:31 )   PT: 11.1 sec;   INR: 1.00          PTT - ( 17 Dec 2017 15:31 )  PTT:28.8 sec  Urinalysis Basic - ( 17 Dec 2017 20:08 )    Color: Yellow / Appearance: Clear / S.010 / pH: x  Gluc: x / Ketone: NEGATIVE  / Bili: Negative / Urobili: 0.2 E.U./dL   Blood: x / Protein: NEGATIVE mg/dL / Nitrite: NEGATIVE   Leuk Esterase: NEGATIVE / RBC: 5-10 /HPF / WBC < 5 /HPF   Sq Epi: x / Non Sq Epi: 0-5 /HPF / Bacteria: Present /HPF      CAPILLARY BLOOD GLUCOSE      POCT Blood Glucose.: 129 mg/dL (17 Dec 2017 22:13)      RADIOLOGY & ADDITIONAL TESTS: Reviewed.    ASSESSMENT:    PLAN: OVERNIGHT EVENTS: SHARON    SUBJECTIVE / INTERVAL HPI: Patient seen and examined at bedside.   Patient w/o complaints, patient able to sleep well with CPAP. unable to note improvement in erythema, pain.   Pt incontinent to urine overnight   Patient without CP, palpitations, SOB, AYALA, Abd pain, NVD.      VITAL SIGNS:  Vital Signs Last 24 Hrs  T(C): 36.7 (18 Dec 2017 05:00), Max: 38.7 (17 Dec 2017 18:22)  T(F): 98.1 (18 Dec 2017 05:00), Max: 101.6 (17 Dec 2017 18:22)  HR: 93 (18 Dec 2017 06:58) (86 - 98)  BP: 122/73 (18 Dec 2017 05:00) (119/72 - 162/90)  BP(mean): 95 (17 Dec 2017 20:01) (95 - 95)  RR: 18 (18 Dec 2017 06:58) (18 - 20)  SpO2: 95% (18 Dec 2017 06:58) (93% - 98%)    PHYSICAL EXAM:    General: NAD, lying in bed comfortably, [id number]  HEENT: NC/AT; PERRL, anicteric sclera; MMM  Neck: supple, no JVD  Cardiovascular: +S1/S2; RRR  Respiratory: CTA B/L; no W/R/R  Gastrointestinal: soft, NT/ND; +BSx4, protuberant   Extremities: WWP; no edema, clubbing or cyanosis. LLE: one 5cm, nonpurulent, scabbed cut with surrounding erythema- unchanged since yesterday. Area erythema improved, non-tender to palpation but warm to touch. Decreased sensation in both LE bilaterally. Limited ROM due to weakness (3/5).  Vascular: 2+ radial, DP/PT pulses B/L  Neurological: AAOx3; no focal deficits    MEDICATIONS:  MEDICATIONS  (STANDING):  acyclovir   Tablet 400 milliGRAM(s) Oral two times a day  aspirin enteric coated 81 milliGRAM(s) Oral daily  atorvastatin 40 milliGRAM(s) Oral at bedtime  clopidogrel Tablet 75 milliGRAM(s) Oral daily  dextrose 5%. 1000 milliLiter(s) (50 mL/Hr) IV Continuous <Continuous>  dextrose 50% Injectable 12.5 Gram(s) IV Push once  dextrose 50% Injectable 25 Gram(s) IV Push once  dextrose 50% Injectable 25 Gram(s) IV Push once  DULoxetine 60 milliGRAM(s) Oral daily  finasteride 5 milliGRAM(s) Oral daily  heparin  Injectable 7500 Unit(s) SubCutaneous every 8 hours  insulin lispro (HumaLOG) corrective regimen sliding scale   SubCutaneous Before meals and at bedtime  lisinopril 20 milliGRAM(s) Oral daily  metoprolol     tartrate 25 milliGRAM(s) Oral two times a day  modafinil 200 milliGRAM(s) Oral two times a day  oxybutynin 5 milliGRAM(s) Oral two times a day  pantoprazole    Tablet 40 milliGRAM(s) Oral before breakfast  pregabalin 100 milliGRAM(s) Oral daily  pregabalin 200 milliGRAM(s) Oral at bedtime  sodium chloride 0.9%. 1000 milliLiter(s) (100 mL/Hr) IV Continuous <Continuous>  tamsulosin 0.4 milliGRAM(s) Oral at bedtime  vancomycin  IVPB 1500 milliGRAM(s) IV Intermittent every 24 hours    MEDICATIONS  (PRN):  dextrose Gel 1 Dose(s) Oral once PRN Blood Glucose LESS THAN 70 milliGRAM(s)/deciliter  glucagon  Injectable 1 milliGRAM(s) IntraMuscular once PRN Glucose LESS THAN 70 milligrams/deciliter      ALLERGIES:  Allergies    Bactrim (Headache)  Sular (Other)    Intolerances    Cipro (Headache)  vancomycin (Red Man Synd)      LABS:                        14.3   9.3   )-----------( 185      ( 17 Dec 2017 15:31 )             42.7     12-    137  |  97  |  24<H>  ----------------------------<  145<H>  4.0   |  24  |  1.12    Ca    8.6      17 Dec 2017 15:31    TPro  7.6  /  Alb  4.2  /  TBili  0.6  /  DBili  x   /  AST  29  /  ALT  37  /  AlkPhos  59  12-17    PT/INR - ( 17 Dec 2017 15:31 )   PT: 11.1 sec;   INR: 1.00          PTT - ( 17 Dec 2017 15:31 )  PTT:28.8 sec  Urinalysis Basic - ( 17 Dec 2017 20:08 )    Color: Yellow / Appearance: Clear / S.010 / pH: x  Gluc: x / Ketone: NEGATIVE  / Bili: Negative / Urobili: 0.2 E.U./dL   Blood: x / Protein: NEGATIVE mg/dL / Nitrite: NEGATIVE   Leuk Esterase: NEGATIVE / RBC: 5-10 /HPF / WBC < 5 /HPF   Sq Epi: x / Non Sq Epi: 0-5 /HPF / Bacteria: Present /HPF      CAPILLARY BLOOD GLUCOSE      POCT Blood Glucose.: 129 mg/dL (17 Dec 2017 22:13)      RADIOLOGY & ADDITIONAL TESTS: Reviewed.

## 2017-12-18 NOTE — DISCHARGE NOTE ADULT - VISION (WITH CORRECTIVE LENSES IF THE PATIENT USUALLY WEARS THEM):
Double vision/Partially impaired: cannot see medication labels or newsprint, but can see obstacles in path, and the surrounding layout; can count fingers at arm's length

## 2017-12-18 NOTE — DISCHARGE NOTE ADULT - CARE PROVIDER_API CALL
Devan Galdamez  [address] [place] [id number]  Phone: [phone]  Fax: (       -

## 2017-12-18 NOTE — PROGRESS NOTE ADULT - SUBJECTIVE AND OBJECTIVE BOX
Patient is a 72y old  Male who presents with a chief complaint of Cellulitis of the lower leg (18 Dec 2017 12:02)      INTERVAL HPI/OVERNIGHT EVENTS:    Pt. seen and examined at 11AM  Pt.'s wife at bedside  Pt. reports improvement in LLE redness, pain, warmth, and swelling  Fever and N/V resolved; no chills    Review of Systems: 12 point review of systems otherwise negative    MEDICATIONS  (STANDING):  acyclovir   Tablet 400 milliGRAM(s) Oral two times a day  aspirin enteric coated 81 milliGRAM(s) Oral daily  atorvastatin 40 milliGRAM(s) Oral at bedtime  clopidogrel Tablet 75 milliGRAM(s) Oral daily  dextrose 5%. 1000 milliLiter(s) (50 mL/Hr) IV Continuous <Continuous>  dextrose 50% Injectable 12.5 Gram(s) IV Push once  dextrose 50% Injectable 25 Gram(s) IV Push once  dextrose 50% Injectable 25 Gram(s) IV Push once  DULoxetine 60 milliGRAM(s) Oral daily  finasteride 5 milliGRAM(s) Oral daily  heparin  Injectable 7500 Unit(s) SubCutaneous every 8 hours  insulin lispro (HumaLOG) corrective regimen sliding scale   SubCutaneous Before meals and at bedtime  lisinopril 20 milliGRAM(s) Oral two times a day  metoprolol     tartrate 25 milliGRAM(s) Oral two times a day  modafinil 200 milliGRAM(s) Oral two times a day  oxybutynin 5 milliGRAM(s) Oral two times a day  pantoprazole    Tablet 40 milliGRAM(s) Oral before breakfast  pregabalin 100 milliGRAM(s) Oral daily  pregabalin 200 milliGRAM(s) Oral at bedtime  tamsulosin 0.4 milliGRAM(s) Oral at bedtime  triamterene 37.5 mG/hydrochlorothiazide 25 mG Capsule 1 Capsule(s) Oral daily  vancomycin  IVPB 1500 milliGRAM(s) IV Intermittent every 24 hours    MEDICATIONS  (PRN):  dextrose Gel 1 Dose(s) Oral once PRN Blood Glucose LESS THAN 70 milliGRAM(s)/deciliter  glucagon  Injectable 1 milliGRAM(s) IntraMuscular once PRN Glucose LESS THAN 70 milligrams/deciliter      Allergies    Bactrim (Headache)  Sular (Other)    Intolerances    Cipro (Headache)  vancomycin (Red Man Synd)        Vital Signs Last 24 Hrs  T(C): 37.9 (18 Dec 2017 10:11), Max: 38.7 (17 Dec 2017 18:22)  T(F): 100.3 (18 Dec 2017 10:11), Max: 101.6 (17 Dec 2017 18:22)  HR: 84 (18 Dec 2017 09:09) (84 - 98)  BP: 172/73 (18 Dec 2017 10:11) (119/72 - 172/73)  BP(mean): 95 (17 Dec 2017 20:01) (95 - 95)  RR: 18 (18 Dec 2017 09:09) (18 - 20)  SpO2: 97% (18 Dec 2017 09:09) (93% - 98%)  CAPILLARY BLOOD GLUCOSE      POCT Blood Glucose.: 159 mg/dL (18 Dec 2017 12:17)  POCT Blood Glucose.: 120 mg/dL (18 Dec 2017 08:00)  POCT Blood Glucose.: 129 mg/dL (17 Dec 2017 22:13)  POCT Blood Glucose.: 161 mg/dL (17 Dec 2017 14:31)       @ 07:01  -   @ 07:00  --------------------------------------------------------  IN: 2000 mL / OUT: 875 mL / NET: 1125 mL        Physical Exam:  (at 11AM)  Daily Height in cm: 172.72 (17 Dec 2017 20:01)    Daily Weight in k (17 Dec 2017 20:01)  General:  non-toxic and well-appearing  HEENT:  MMM  Abdomen:  obese  Extremities:  LLE w/ improved erythema, edema, warmth, and tenderness; no pus or fluctuance   Skin:  WWP  :  +[place] cath  Neuro:  AAOx3    LABS:                        12.4   4.8   )-----------( 142      ( 18 Dec 2017 08:18 )             38.3     1218    135  |  101  |  17  ----------------------------<  129<H>  3.4<L>   |  22  |  1.04    Ca    7.7<L>      18 Dec 2017 08:18  Mg     1.8     12-18    TPro  7.6  /  Alb  4.2  /  TBili  0.6  /  DBili  x   /  AST  29  /  ALT  37  /  AlkPhos  59  12-17    PT/INR - ( 17 Dec 2017 15:31 )   PT: 11.1 sec;   INR: 1.00          PTT - ( 17 Dec 2017 15:31 )  PTT:28.8 sec  Urinalysis Basic - ( 17 Dec 2017 20:08 )    Color: Yellow / Appearance: Clear / S.010 / pH: x  Gluc: x / Ketone: NEGATIVE  / Bili: Negative / Urobili: 0.2 E.U./dL   Blood: x / Protein: NEGATIVE mg/dL / Nitrite: NEGATIVE   Leuk Esterase: NEGATIVE / RBC: 5-10 /HPF / WBC < 5 /HPF   Sq Epi: x / Non Sq Epi: 0-5 /HPF / Bacteria: Present /HPF          RADIOLOGY & ADDITIONAL TESTS:    ---------------------------------------------------------------------------  I personally reviewed: [  ]EKG   [  ]CXR    [  ] CT    [  ]Other  ---------------------------------------------------------------------------  PLEASE CHECK WHEN PRESENT:     [  ]Heart Failure     [  ] Acute     [  ] Acute on Chronic     [  ] Chronic  -------------------------------------------------------------------     [  ]Diastolic [HFpEF]     [  ]Systolic [HFrEF]     [  ]Combined [HFpEF & HFrEF]     [  ]Other:  -------------------------------------------------------------------  [  ]ANNE     [  ]ATN     [  ]Reneal Medullary Necrosis     [  ]Renal Cortical Necrosis     [  ]Other Pathological Lesions:    [  ]CKD 1  [  ]CKD 2  [  ]CKD 3  [  ]CKD 4  [  ]CKD 5  [  ]Other  -------------------------------------------------------------------  [  ]Other/Unspecified:    --------------------------------------------------------------------    Abdominal Nutritional Status  [  ]Malnutrition: See Nutrition Note  [  ]Cachexia  [  ]Other:   [  ]Supplement Ordered:  [  ]Morbid Obesity (BMI >=40]

## 2017-12-18 NOTE — DISCHARGE NOTE ADULT - PROVIDER TOKENS
FREE:[LAST:[Rudolph],FIRST:[Devan],PHONE:[phone]],FAX:[(   [phone]    -],ADDRESS:[[address] [place], [place] [id number]]]

## 2017-12-18 NOTE — PROGRESS NOTE ADULT - PROBLEM SELECTOR PLAN 1
POA, d/t LLE cellulitis; clinically-improved; cont. IV Vanc fow now (day #2/5+; infusing slowly, given h/o hypersensitivity), elevate leg, f/u cultures; lactic acidosis resolved; recurrent, h/o MRSA SSTIs, has outpatient Podiatry f/u for preventative care

## 2017-12-18 NOTE — PROGRESS NOTE ADULT - PROBLEM SELECTOR PLAN 8
Patient with DMII (reported A1c of 6) but c/b diabetic neuropathy. Home metformin HCl 500mg BID held on admission. -160  - Insulin sliding scale  - Consistent carb diabetic diet    # Obstructive Sleep Apnea (EDELMIRA): pt w/ CPAP at night  - CPAP O/N Patient with DMII (A1c of 6.2) but c/b diabetic neuropathy. Home metformin HCl 500mg BID held on admission. -160  - Insulin sliding scale  - Consistent carb diabetic diet    # Obstructive Sleep Apnea (EDELMIRA): pt w/ CPAP at night  - CPAP O/N

## 2017-12-18 NOTE — PROGRESS NOTE ADULT - ASSESSMENT
72yM with PMH of MM s/p chemo (>15yo), DM (A1c6) c/b peripheral neuropathy MRSA cellulitis, TIA x2 on ASA/Plx, HTN, HLD, BPH w/ urge incontinence, Chronic BP with DRG stimulator presented from home with c/p of worsening L LE swelling, redness, pain and generalized weakness a/w cellulitis, c/b severe sepsis (since resolved) currently on IV Abx (Vanc)

## 2017-12-18 NOTE — DISCHARGE NOTE ADULT - PLAN OF CARE
Treatment Plan While you admitted for evaluation of your cellulitis, one of the four blood cultures bottles grew bacteria. Given the type of bacteria, you were evaluated for concern for endocarditis, a condition in which a vegetation or collection of bacteria is present on one of your heart valves. You had a echocardiogram, which showed XXXX. You were placed on antibiotics on admission, which was later changed. Please continue to take your antibiotic XXXX for another XXX. You were found to have a cellulitis, an infection of your skin, on admission. You were given IV fluids and antibiotics, with significant improvement in your symptoms. On admission, you were found to be septic. You were provided appropriate interventions of IV fluids, antibiotics and close monitoring. You continued to improve throughout your hospital course. Please continue to take your medications, Aspirin and Plavix, as prescribed and follow up with your outpatient primary care provider. You were hypertensive throughout your hospitalization and were started on Norvasc 5mg. Please continue to take your medications as prescribed and follow up with your outpatient primary care provider. While you admitted for evaluation of your cellulitis, one of the four blood cultures bottles grew bacteria. Given the type of bacteria, you were evaluated for concern for endocarditis, a condition in which a vegetation or collection of bacteria is present on one of your heart valves. You had a echocardiogram, which showed no vegetations. You have been on  antibiotics on admission. Please continue to take your antibiotic Levaquin for another 8 days and follow up with your primary care provider 1 week after discharge from the hospital.

## 2017-12-18 NOTE — DISCHARGE NOTE ADULT - CARE PLAN
Principal Discharge DX:	Cellulitis of left lower extremity  Secondary Diagnosis:	Severe sepsis  Secondary Diagnosis:	TIA (transient ischemic attack)  Secondary Diagnosis:	Hypertension  Secondary Diagnosis:	BPH (benign prostatic hyperplasia)  Secondary Diagnosis:	Multiple myeloma Principal Discharge DX:	Bacteremia  Goal:	Treatment Plan  Instructions for follow-up, activity and diet:	While you admitted for evaluation of your cellulitis, one of the four blood cultures bottles grew bacteria. Given the type of bacteria, you were evaluated for concern for endocarditis, a condition in which a vegetation or collection of bacteria is present on one of your heart valves. You had a echocardiogram, which showed XXXX. You were placed on antibiotics on admission, which was later changed. Please continue to take your antibiotic XXXX for another XXX.  Secondary Diagnosis:	Cellulitis of left lower extremity  Instructions for follow-up, activity and diet:	You were found to have a cellulitis, an infection of your skin, on admission. You were given IV fluids and antibiotics, with significant improvement in your symptoms.  Secondary Diagnosis:	Severe sepsis  Instructions for follow-up, activity and diet:	On admission, you were found to be septic. You were provided appropriate interventions of IV fluids, antibiotics and close monitoring. You continued to improve throughout your hospital course.  Secondary Diagnosis:	TIA (transient ischemic attack)  Instructions for follow-up, activity and diet:	Please continue to take your medications, Aspirin and Plavix, as prescribed and follow up with your outpatient primary care provider.  Secondary Diagnosis:	Hypertension  Instructions for follow-up, activity and diet:	You were hypertensive throughout your hospitalization and were started on Norvasc 5mg. Please continue to take your medications as prescribed and follow up with your outpatient primary care provider. Principal Discharge DX:	Bacteremia  Goal:	Treatment Plan  Instructions for follow-up, activity and diet:	While you admitted for evaluation of your cellulitis, one of the four blood cultures bottles grew bacteria. Given the type of bacteria, you were evaluated for concern for endocarditis, a condition in which a vegetation or collection of bacteria is present on one of your heart valves. You had a echocardiogram, which showed no vegetations. You have been on  antibiotics on admission. Please continue to take your antibiotic Levaquin for another 8 days and follow up with your primary care provider 1 week after discharge from the hospital.  Secondary Diagnosis:	Cellulitis of left lower extremity  Instructions for follow-up, activity and diet:	You were found to have a cellulitis, an infection of your skin, on admission. You were given IV fluids and antibiotics, with significant improvement in your symptoms.  Secondary Diagnosis:	Severe sepsis  Instructions for follow-up, activity and diet:	On admission, you were found to be septic. You were provided appropriate interventions of IV fluids, antibiotics and close monitoring. You continued to improve throughout your hospital course.  Secondary Diagnosis:	TIA (transient ischemic attack)  Instructions for follow-up, activity and diet:	Please continue to take your medications, Aspirin and Plavix, as prescribed and follow up with your outpatient primary care provider.  Secondary Diagnosis:	Hypertension  Instructions for follow-up, activity and diet:	You were hypertensive throughout your hospitalization and were started on Norvasc 5mg. Please continue to take your medications as prescribed and follow up with your outpatient primary care provider.

## 2017-12-18 NOTE — DISCHARGE NOTE ADULT - HOSPITAL COURSE
72yM with PMH of MM s/p chemo (>13yo), DM (A1c6) c/b peripheral neuropathy MRSA cellulitis, TIA x2 on ASA/Plx, HTN, HLD, BPH w/ urge incontinence presenting from home with complaints of worsening L LE swelling, redness, pain and generalized weakness - symptoms consistent with prior episodes of cellulitis. In the ED: T37.6 HR 98 146/93 RR 18 (93%). Patient given 1L NS and 1g Vanc IV. Shortly after admission, patient spiked and met severe sepsis criteria with an elevated lactate 2.3; CXR and UA negative, Bcx negative. Patient was adequately fluid resuscitated (30cc/Kg) and repeat lactate WNL. On exam, there was a non-purulent abrasion at LLE above alteral malleolus, with surround erythema. Patient was transferred to [facility] with admission dx of cellulitis c/b severe sepsis. Patient was continue on IV vancomycin 1.5g q24h with notable improvement in L LE cellulitis and systemic symptoms. Patient was restarted on all home medications (extensive list) with clinical improvement, with plan for discharge on Keflex + doxy for another XXX days. Of note, patient is visiting from [place] and will be traveling day of discharge to [place]. A detailed discussion regarding follow up care and adverse medications reactions took place and instructions to report to ED/Urgent Care with any concerns for above- patient and spouse demonstrated understanding. Patient is hemodynamically stable and safe for discharge with plan for outpatient follow up in [place] upon their return December 26. 72yM with PMH of MM s/p chemo (>15yo), DM (A1c6) c/b peripheral neuropathy MRSA cellulitis, TIA x2 on ASA/Plx, HTN, HLD, BPH w/ urge incontinence presenting from home with complaints of worsening L LE swelling, redness, pain and generalized weakness - symptoms consistent with prior episodes of cellulitis. In the ED: T37.6 HR 98 146/93 RR 18 (93%). Patient given 1L NS and 1g Vanc IV. Shortly after admission, patient spiked and met severe sepsis criteria with an elevated lactate 2.3; CXR and UA negative, Bcx negative. Patient was adequately fluid resuscitated (30cc/Kg) and repeat lactate WNL. On exam, there was a non-purulent abrasion at LLE above alteral malleolus, with surround erythema. Patient was transferred to [facility] [place] with admission dx of cellulitis c/b severe sepsis. Patient was continue on IV vancomycin 1.5g q24h with notable improvement in L LE cellulitis and systemic symptoms. Patient was restarted on all home medications (extensive list) with clinical improvement. 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 Abx was switched from Vancomycin to ceftriaxone. Given the concern for endocarditis a patient had a TTE, which could not rule out vegetations On 12/22, patient underwent a ESTELLA, which demonstrated [id number].       Of note, patient is visiting from [place]; a detailed discussion regarding follow up care and adverse medication reactions took place and instructions to report to ED/Urgent Care with any concerns patient and spouse demonstrated understanding. Patient is hemodynamically stable and safe for discharge with plan for outpatient follow up in [place] upon their return December 26. 72yM with PMH of MM s/p chemo (>15yo), DM (A1c6) c/b peripheral neuropathy MRSA cellulitis, TIA x2 on ASA/Plx, HTN, HLD, BPH w/ urge incontinence presenting from home with complaints of worsening L LE swelling, redness, pain and generalized weakness - symptoms consistent with prior episodes of cellulitis. In the ED: T37.6 HR 98 146/93 RR 18 (93%). Patient given 1L NS and 1g Vanc IV. Shortly after admission, patient spiked and met severe sepsis criteria with an elevated lactate 2.3; CXR and UA negative, Bcx negative. Patient was adequately fluid resuscitated (30cc/Kg) and repeat lactate WNL. On exam, there was a non-purulent abrasion at LLE above alteral malleolus, with surround erythema. Patient was transferred to [facility] with admission dx of cellulitis c/b severe sepsis. Patient was continue on IV vancomycin 1.5g q24h with notable improvement in L LE cellulitis and systemic symptoms. Patient was restarted on all home medications (extensive list) with clinical improvement. 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 Abx was switched from Vancomycin to ceftriaxone. Given the concern for endocarditis a patient had a TTE, which could not rule out vegetations but on 12/22, patient underwent a ESTELLA, which demonstrated no vegetations. Per ID, patient may be discharged with course of Levaquin for 14 day from last negative culture (although ceftriaxone was preferred but given social situation and need for travel back to [place], PO option was preferred by patient). Of note, patient is visiting from [place]; a detailed discussion regarding follow up care and adverse medication reactions took place and instructions to report to ED/Urgent Care with any concerns, patient and spouse demonstrated understanding. Patient is hemodynamically stable and safe for discharge with plan for outpatient follow up in [place] upon their return December 26.

## 2017-12-18 NOTE — DISCHARGE NOTE ADULT - ADDITIONAL INSTRUCTIONS
Follow up with your PCP within one week.  Continue taking your Levaquin as prescribed.  Return to an ER with any new or worsening symptoms.

## 2017-12-19 DIAGNOSIS — R78.81 BACTEREMIA: ICD-10-CM

## 2017-12-19 LAB
-  CANDIDA ALBICANS: SIGNIFICANT CHANGE UP
-  CANDIDA GLABRATA: SIGNIFICANT CHANGE UP
-  CANDIDA KRUSEI: SIGNIFICANT CHANGE UP
-  CANDIDA PARAPSILOSIS: SIGNIFICANT CHANGE UP
-  CANDIDA TROPICALIS: SIGNIFICANT CHANGE UP
-  COAGULASE NEGATIVE STAPHYLOCOCCUS: SIGNIFICANT CHANGE UP
-  K. PNEUMONIAE GROUP: SIGNIFICANT CHANGE UP
-  KPC RESISTANCE GENE: SIGNIFICANT CHANGE UP
-  STREPTOCOCCUS SP. (NOT GRP A, B OR S PNEUMONIAE): SIGNIFICANT CHANGE UP
A BAUMANNII DNA SPEC QL NAA+PROBE: SIGNIFICANT CHANGE UP
ANION GAP SERPL CALC-SCNC: 14 MMOL/L — SIGNIFICANT CHANGE UP (ref 5–17)
BASOPHILS NFR BLD AUTO: 0.2 % — SIGNIFICANT CHANGE UP (ref 0–2)
BUN SERPL-MCNC: 15 MG/DL — SIGNIFICANT CHANGE UP (ref 7–23)
CALCIUM SERPL-MCNC: 8.5 MG/DL — SIGNIFICANT CHANGE UP (ref 8.4–10.5)
CHLORIDE SERPL-SCNC: 101 MMOL/L — SIGNIFICANT CHANGE UP (ref 96–108)
CO2 SERPL-SCNC: 24 MMOL/L — SIGNIFICANT CHANGE UP (ref 22–31)
CREAT SERPL-MCNC: 1.06 MG/DL — SIGNIFICANT CHANGE UP (ref 0.5–1.3)
E CLOAC COMP DNA BLD POS QL NAA+PROBE: SIGNIFICANT CHANGE UP
E COLI DNA BLD POS QL NAA+NON-PROBE: SIGNIFICANT CHANGE UP
ENTEROCOC DNA BLD POS QL NAA+NON-PROBE: SIGNIFICANT CHANGE UP
ENTEROCOC DNA BLD POS QL NAA+NON-PROBE: SIGNIFICANT CHANGE UP
EOSINOPHIL NFR BLD AUTO: 1.5 % — SIGNIFICANT CHANGE UP (ref 0–6)
GLUCOSE BLDC GLUCOMTR-MCNC: 117 MG/DL — HIGH (ref 70–99)
GLUCOSE BLDC GLUCOMTR-MCNC: 125 MG/DL — HIGH (ref 70–99)
GLUCOSE BLDC GLUCOMTR-MCNC: 137 MG/DL — HIGH (ref 70–99)
GLUCOSE BLDC GLUCOMTR-MCNC: 156 MG/DL — HIGH (ref 70–99)
GLUCOSE SERPL-MCNC: 124 MG/DL — HIGH (ref 70–99)
GP B STREP DNA BLD POS QL NAA+NON-PROBE: SIGNIFICANT CHANGE UP
GRAM STN FLD: SIGNIFICANT CHANGE UP
HAEM INFLU DNA BLD POS QL NAA+NON-PROBE: SIGNIFICANT CHANGE UP
HCT VFR BLD CALC: 39 % — SIGNIFICANT CHANGE UP (ref 39–50)
HGB BLD-MCNC: 12.7 G/DL — LOW (ref 13–17)
K OXYTOCA DNA BLD POS QL NAA+NON-PROBE: SIGNIFICANT CHANGE UP
L MONOCYTOG DNA BLD POS QL NAA+NON-PROBE: SIGNIFICANT CHANGE UP
LYMPHOCYTES # BLD AUTO: 21.6 % — SIGNIFICANT CHANGE UP (ref 13–44)
MAGNESIUM SERPL-MCNC: 2.4 MG/DL — SIGNIFICANT CHANGE UP (ref 1.6–2.6)
MCHC RBC-ENTMCNC: 29.5 PG — SIGNIFICANT CHANGE UP (ref 27–34)
MCHC RBC-ENTMCNC: 32.6 G/DL — SIGNIFICANT CHANGE UP (ref 32–36)
MCV RBC AUTO: 90.7 FL — SIGNIFICANT CHANGE UP (ref 80–100)
METHOD TYPE: SIGNIFICANT CHANGE UP
MONOCYTES NFR BLD AUTO: 14.4 % — HIGH (ref 2–14)
MRSA SPEC QL CULT: SIGNIFICANT CHANGE UP
MSSA DNA SPEC QL NAA+PROBE: SIGNIFICANT CHANGE UP
N MEN ISLT CULT: SIGNIFICANT CHANGE UP
NEUTROPHILS NFR BLD AUTO: 62.3 % — SIGNIFICANT CHANGE UP (ref 43–77)
P AERUGINOSA DNA BLD POS NAA+NON-PROBE: SIGNIFICANT CHANGE UP
PLATELET # BLD AUTO: 157 K/UL — SIGNIFICANT CHANGE UP (ref 150–400)
POTASSIUM SERPL-MCNC: 3.8 MMOL/L — SIGNIFICANT CHANGE UP (ref 3.5–5.3)
POTASSIUM SERPL-SCNC: 3.8 MMOL/L — SIGNIFICANT CHANGE UP (ref 3.5–5.3)
PROTEUS SP DNA BLD POS QL NAA+NON-PROBE: SIGNIFICANT CHANGE UP
RBC # BLD: 4.3 M/UL — SIGNIFICANT CHANGE UP (ref 4.2–5.8)
RBC # FLD: 14.5 % — SIGNIFICANT CHANGE UP (ref 10.3–16.9)
S MARCESCENS DNA BLD POS NAA+NON-PROBE: SIGNIFICANT CHANGE UP
S PNEUM DNA BLD POS QL NAA+NON-PROBE: SIGNIFICANT CHANGE UP
S PYO DNA BLD POS QL NAA+NON-PROBE: SIGNIFICANT CHANGE UP
SODIUM SERPL-SCNC: 139 MMOL/L — SIGNIFICANT CHANGE UP (ref 135–145)
WBC # BLD: 6 K/UL — SIGNIFICANT CHANGE UP (ref 3.8–10.5)
WBC # FLD AUTO: 6 K/UL — SIGNIFICANT CHANGE UP (ref 3.8–10.5)

## 2017-12-19 PROCEDURE — 99233 SBSQ HOSP IP/OBS HIGH 50: CPT | Mod: GC

## 2017-12-19 PROCEDURE — 70450 CT HEAD/BRAIN W/O DYE: CPT | Mod: 26

## 2017-12-19 PROCEDURE — 99222 1ST HOSP IP/OBS MODERATE 55: CPT | Mod: GC

## 2017-12-19 RX ORDER — POTASSIUM CHLORIDE 20 MEQ
20 PACKET (EA) ORAL ONCE
Qty: 0 | Refills: 0 | Status: COMPLETED | OUTPATIENT
Start: 2017-12-19 | End: 2017-12-19

## 2017-12-19 RX ORDER — ACETAMINOPHEN 500 MG
650 TABLET ORAL ONCE
Qty: 0 | Refills: 0 | Status: COMPLETED | OUTPATIENT
Start: 2017-12-19 | End: 2017-12-19

## 2017-12-19 RX ADMIN — CLOPIDOGREL BISULFATE 75 MILLIGRAM(S): 75 TABLET, FILM COATED ORAL at 11:55

## 2017-12-19 RX ADMIN — Medication 25 MILLIGRAM(S): at 17:37

## 2017-12-19 RX ADMIN — MODAFINIL 200 MILLIGRAM(S): 200 TABLET ORAL at 08:55

## 2017-12-19 RX ADMIN — HEPARIN SODIUM 7500 UNIT(S): 5000 INJECTION INTRAVENOUS; SUBCUTANEOUS at 23:01

## 2017-12-19 RX ADMIN — LISINOPRIL 20 MILLIGRAM(S): 2.5 TABLET ORAL at 06:49

## 2017-12-19 RX ADMIN — Medication 100 MILLIGRAM(S): at 11:56

## 2017-12-19 RX ADMIN — Medication 400 MILLIGRAM(S): at 06:49

## 2017-12-19 RX ADMIN — MODAFINIL 200 MILLIGRAM(S): 200 TABLET ORAL at 17:36

## 2017-12-19 RX ADMIN — HEPARIN SODIUM 7500 UNIT(S): 5000 INJECTION INTRAVENOUS; SUBCUTANEOUS at 14:45

## 2017-12-19 RX ADMIN — FINASTERIDE 5 MILLIGRAM(S): 5 TABLET, FILM COATED ORAL at 11:55

## 2017-12-19 RX ADMIN — Medication 1: at 23:02

## 2017-12-19 RX ADMIN — Medication 300 MILLIGRAM(S): at 19:22

## 2017-12-19 RX ADMIN — LISINOPRIL 20 MILLIGRAM(S): 2.5 TABLET ORAL at 17:36

## 2017-12-19 RX ADMIN — Medication 25 MILLIGRAM(S): at 06:49

## 2017-12-19 RX ADMIN — HEPARIN SODIUM 7500 UNIT(S): 5000 INJECTION INTRAVENOUS; SUBCUTANEOUS at 06:49

## 2017-12-19 RX ADMIN — ATORVASTATIN CALCIUM 40 MILLIGRAM(S): 80 TABLET, FILM COATED ORAL at 23:02

## 2017-12-19 RX ADMIN — Medication 1 CAPSULE(S): at 07:48

## 2017-12-19 RX ADMIN — DULOXETINE HYDROCHLORIDE 60 MILLIGRAM(S): 30 CAPSULE, DELAYED RELEASE ORAL at 11:55

## 2017-12-19 RX ADMIN — TAMSULOSIN HYDROCHLORIDE 0.4 MILLIGRAM(S): 0.4 CAPSULE ORAL at 23:02

## 2017-12-19 RX ADMIN — Medication 5 MILLIGRAM(S): at 06:49

## 2017-12-19 RX ADMIN — Medication 81 MILLIGRAM(S): at 11:55

## 2017-12-19 RX ADMIN — Medication 5 MILLIGRAM(S): at 17:37

## 2017-12-19 RX ADMIN — Medication 200 MILLIGRAM(S): at 23:02

## 2017-12-19 RX ADMIN — Medication 20 MILLIEQUIVALENT(S): at 11:55

## 2017-12-19 RX ADMIN — Medication 400 MILLIGRAM(S): at 17:36

## 2017-12-19 RX ADMIN — Medication 650 MILLIGRAM(S): at 23:02

## 2017-12-19 RX ADMIN — PANTOPRAZOLE SODIUM 40 MILLIGRAM(S): 20 TABLET, DELAYED RELEASE ORAL at 06:49

## 2017-12-19 NOTE — CONSULT NOTE ADULT - SUBJECTIVE AND OBJECTIVE BOX
HPI:  72yM with PMH of MM s/p chemo (>15yo), DM (A1c6) c/b peripheral neuropathy MRSA cellulitis, TIA x2 on ASA/Plx, HTN, HLD, BPH w/ urge incontinence presenting from home with complaints of worsening L LE swelling, redness, pain and generalized weakness. Pt and spouse, primary care taker, traveling from [place] 2 to [facility] 2 days ago. At that time, spouse noted non-healing, well demarcated cut which "happens all the time." Topical bacitracin, bandage applied, + clear drainage. Denies fever, chills, systemic symptoms. Yesterday, pt began o fell "generally sick," described as generalized weakness, tiredness, nausea and had one episode of vomiting this AM w/ noted worsening of redness around L LE, similar to prior cellulitis episodes. Pt has been hospitalized >5x for cellulitis, on number occasions MRSA. Pt sees PCP and podiatrist in [place] regularly for DM foot care and maintenance. Pt felt "feverish" this AM but did not take anything for it-- the worsening presentation of LE cellulitis motivated them to present to ED. Of note, pt endorsed non-productive cough x2days. Denies chest pain, palpitations, occasional HA, No SOB, AYALA, able to ambulate w/ assistance at baseline. No new numbness or focal neurologic symptoms, no difficulty word finding or speaking.     In the ED: T37.6 HR 98 146/93 RR 18 (93%). Patient given 1L NS and 1g Vanc IV. Shortly after admission while in [facility], patient spike a fever T 101.6 (o) 162/90 HR 93 RR 18 (95%) on RA with elevated Lactate 2.3,, meeting sepsis criteria. 2 additional L were ordered, including CXR, UA, BCx were collected on admission. Patient was transferred to [facility] for further management. (17 Dec 2017 19:05)    INTERVAL HPI/OVERNIGHT EVENTS: Patient seen and examined at bedside. Patient iterates above story, feels he is getting better and that the erythema around his LLE wound is improving. Patient still with intermittent fever, chills, no cough, no SOB, no CP, no palpitations, no abdominal pain, no n/v/d/c, no dysuria. Patient with multiple small wounds on the b/l LE of which he states are chronic in nature.    CONSTITUTIONAL: Positive fevers and chills  EYES:  Negative  blurry vision or double vision  CARDIOVASCULAR:  Negative for chest pain or palpitations  RESPIRATORY:  Negative for cough, wheezing, or SOB   GASTROINTESTINAL:  Negative for nausea, vomiting, diarrhea, constipation, or abdominal pain  GENITOURINARY:  Negative frequency, urgency or dysuria  NEUROLOGIC:  No headache, confusion, dizziness, lightheadedness      ANTIBIOTICS/RELEVANT:    MEDICATIONS  (STANDING):  acyclovir   Tablet 400 milliGRAM(s) Oral two times a day  aspirin enteric coated 81 milliGRAM(s) Oral daily  atorvastatin 40 milliGRAM(s) Oral at bedtime  clopidogrel Tablet 75 milliGRAM(s) Oral daily  dextrose 5%. 1000 milliLiter(s) (50 mL/Hr) IV Continuous <Continuous>  dextrose 50% Injectable 12.5 Gram(s) IV Push once  dextrose 50% Injectable 25 Gram(s) IV Push once  dextrose 50% Injectable 25 Gram(s) IV Push once  DULoxetine 60 milliGRAM(s) Oral daily  finasteride 5 milliGRAM(s) Oral daily  heparin  Injectable 7500 Unit(s) SubCutaneous every 8 hours  insulin lispro (HumaLOG) corrective regimen sliding scale   SubCutaneous Before meals and at bedtime  lisinopril 20 milliGRAM(s) Oral two times a day  metoprolol     tartrate 25 milliGRAM(s) Oral two times a day  modafinil 200 milliGRAM(s) Oral two times a day  oxybutynin 5 milliGRAM(s) Oral two times a day  pantoprazole    Tablet 40 milliGRAM(s) Oral before breakfast  pregabalin 100 milliGRAM(s) Oral daily  pregabalin 200 milliGRAM(s) Oral at bedtime  tamsulosin 0.4 milliGRAM(s) Oral at bedtime  triamterene 37.5 mG/hydrochlorothiazide 25 mG Capsule 1 Capsule(s) Oral daily  vancomycin  IVPB 1500 milliGRAM(s) IV Intermittent every 24 hours    MEDICATIONS  (PRN):  dextrose Gel 1 Dose(s) Oral once PRN Blood Glucose LESS THAN 70 milliGRAM(s)/deciliter  glucagon  Injectable 1 milliGRAM(s) IntraMuscular once PRN Glucose LESS THAN 70 milligrams/deciliter        Vital Signs Last 24 Hrs  T(C): 37 (19 Dec 2017 16:36), Max: 38.7 (18 Dec 2017 21:44)  T(F): 98.6 (19 Dec 2017 16:36), Max: 101.6 (18 Dec 2017 21:44)  HR: 64 (19 Dec 2017 16:36) (64 - 81)  BP: 137/72 (19 Dec 2017 16:36) (136/76 - 171/78)  BP(mean): --  RR: 18 (19 Dec 2017 16:36) (16 - 18)  SpO2: 96% (19 Dec 2017 16:36) (94% - 98%)    PHYSICAL EXAM:  Constitutional: Obese elderly male in NAD  Eyes: SIERRA, EOMI  Ear/Nose/Throat: no oral lesion, no sinus tenderness on percussion	  Neck: no JVD, no lymphadenopathy, supple, no nuchal rigidity  Respiratory: CTA shante  Cardiovascular: S1S2 RRR, no murmurs  Gastrointestinal: Obese with scar in RUQ with prominent and reducible hernia, +bs, NTND  Extremities: RUE with port in forearm without any erythema, induration, or fluctuance; LLE with 1cm excoriation, scabbed, with 2cm surrounding erythema with minimal induration, no fluctuance, receding from marked margins; multiple toes with small abrasions, none of which appear erythematous, purulent, or drianing  Vascular: DP Pulse:	right normal; left normal      LABS:                        12.7   6.0   )-----------( 157      ( 19 Dec 2017 08:16 )             39.0     12-    139  |  101  |  15  ----------------------------<  124<H>  3.8   |  24  |  1.06    Ca    8.5      19 Dec 2017 08:16  Mg     2.4     -        Urinalysis Basic - ( 17 Dec 2017 20:08 )    Color: Yellow / Appearance: Clear / S.010 / pH: x  Gluc: x / Ketone: NEGATIVE  / Bili: Negative / Urobili: 0.2 E.U./dL   Blood: x / Protein: NEGATIVE mg/dL / Nitrite: NEGATIVE   Leuk Esterase: NEGATIVE / RBC: 5-10 /HPF / WBC < 5 /HPF   Sq Epi: x / Non Sq Epi: 0-5 /HPF / Bacteria: Present /HPF        MICROBIOLOGY:    Culture - Urine (collected 17 Dec 2017 21:35)  Source: .Urine Clean Catch (Midstream)  Final Report (18 Dec 2017 10:43):    Less than 10,000 cols/cc; Insignificant amount of growth.    Culture - Blood (collected 17 Dec 2017 16:21)  Source: .Blood Blood  Gram Stain (19 Dec 2017 09:06):    Anaerobic Bottle: Gram Positive Cocci in Clusters    Result called to and read back bySonal Bucio RN  2017 09:05:55    ***Blood Panel PCR results on this specimen are available    approximately 3 hours after the Gram stain result.***    Gram stain,PCR, and/or culture results may not always    correspond due to difference in methodologies.    ************************************************************    This PCR assay was performed using [employer].    The following targets are tested for: Enterococcus,    vancomycin resistant enterococci, Listeria monocytogenes,    coagulase negative staphylococci, S. aureus,    methicillin resistant S. aureus, Streptococcus agalactiae    (Group B), S. pneumoniae, S. pyogenes (Group A),    Acinetobacter baumannii, Enterobacter cloacae, E. coli,    Klebsiella oxytoca, K. pneumoniae, Proteus sp.,    Serratia marcescens, Haemophilus influenzae,    Neisseria meningitidis, Pseudomonas aeruginosa, Candida    albicans, C. glabrata, C krusei, C parapsilosis,    C. tropicalis andthe KPC resistance gene.  Preliminary Report (19 Dec 2017 09:17):    Culture in progress  Organism: Blood Culture PCR (19 Dec 2017 09:34)  Organism: Blood Culture PCR (19 Dec 2017 09:34)    Culture - Blood (collected 17 Dec 2017 16:21)  Source: .Blood Blood  Preliminary Report (19 Dec 2017 17:01):    No growth at 2 days.      RADIOLOGY & ADDITIONAL STUDIES:  Reviewed

## 2017-12-19 NOTE — PROGRESS NOTE ADULT - PROBLEM SELECTOR PLAN 6
Patient with reported HTN at home, on MTP 25mg BID, triamterene HCTZ 37.5/25mg daily. sBP 141-162. Last dose taken this AM.   - c/w MTP 25mg BID  - c/w lisinopril 20mg BID  - holding hctz-triamterene in setting of dry MM     # HLD   - c/w atorvastatin 40mg Patient with reported HTN at home, on MTP 25mg BID, triamterene HCTZ 37.5/25mg daily. sBP 160-170s. Last dose taken this AM.   - c/w MTP 25mg BID  - c/w lisinopril 20mg BID  - c/w (restarted yesterday) hctz-triamterene    # HLD   - c/w atorvastatin 40mg

## 2017-12-19 NOTE — CONSULT NOTE ADULT - ATTENDING COMMENTS
I have reviewed the medical record, including laboratory and radiographic studies, interviewed and examined the patient and discussed the plan with Dr. Clark, the ID Resident.  Agree with above. Will continue to follow with you - ID service.

## 2017-12-19 NOTE — PROGRESS NOTE ADULT - PROBLEM SELECTOR PLAN 2
Pt with clinical h/o MRSA cellulitis c/b sepsis >5 times. Pt w/ DM neuropathy and multiple lesions on the LE. S/S of L LE cellulitis on exam, non-purulent.   - c/w Vanc 1.5g IVP q24 with trough before 4th dose - ensure to run slowly with concern for red man syndrome.   - continue to monitor and consider wound consult Pt w/ severe sepsis criteria (since resolved) with T 101.6F HR 93 Lactate 2.3 likely 2/2 to cellulitis, pt w/ h/o MRSA cellulitis c/b sepsis. Blood Cultures: NGTD. CXR no acute infil UA negative. Pt febrile O/N, VSS. WBC continues to be WNL, likely 2/2 on going cellulitis infection currently being managed w/ IV ABx  - c/w Vanc 1.5g IVP q24 with trough before 4th dose - ensure to run slowly with concern for red man syndrome.   - s/p IVF resuscitation with goal 30cc/Kg on initial presentation, tolerating PO intake.     #Lactic Acidosis: RESOLVED patient w/ elevated lactate on admission to 2.3, since cleared after 3L IVF - repeat 1.3  - Tylenol 650mg PRN for T>38C  - continue to monitor

## 2017-12-19 NOTE — PROGRESS NOTE ADULT - SUBJECTIVE AND OBJECTIVE BOX
OVERNIGHT EVENTS: SHARON overnight  Patient had T 101.6 168/78 HR 80 - fever w/u deferred as BCx last 24h 	    SUBJECTIVE / INTERVAL HPI: Patient seen and examined at bedside.   Pt w/o complaints at this time, states he feels "the same" as the prior day. C/o CPAP dying him out.   Pt denies worsening cough, SOB, chills, dysuria, back pain. Feels that cellulitis of LE is improving    VITAL SIGNS:  Vital Signs Last 24 Hrs  T(C): 37.1 (19 Dec 2017 05:11), Max: 38.7 (18 Dec 2017 21:44)  T(F): 98.7 (19 Dec 2017 05:11), Max: 101.6 (18 Dec 2017 21:44)  HR: 81 (19 Dec 2017 05:11) (80 - 84)  BP: 171/78 (19 Dec 2017 05:11) (160/81 - 195/89)  BP(mean): --  RR: 16 (19 Dec 2017 05:11) (16 - 18)  SpO2: 95% (19 Dec 2017 05:11) (94% - 97%)    PHYSICAL EXAM:    General: NAD, patient lying in bed comfortably.  HEENT: NC/AT; PERRL, anicteric sclera; MMM  Neck: supple  Cardiovascular: +S1/S2; RRR  Respiratory: CTA B/L; no W/R/R  Gastrointestinal: protuberant soft, NT/ND; +BSx4. Well healed surgical scar w/ non-tender abd hernia  Extremities: WWP; no edema, clubbing or cyanosis. LLE: one 3cm, nonpurulent, scabbed cut with surrounding erythema- slightly improved since yesterday. Area erythema improved, non-tender to palpation but warm to touch. Decreased sensation in both LE bilaterally.   Vascular: 2+ radial, DP/PT pulses B/L  Neurological: AAOx3; no focal deficits    MEDICATIONS:  MEDICATIONS  (STANDING):  acyclovir   Tablet 400 milliGRAM(s) Oral two times a day  aspirin enteric coated 81 milliGRAM(s) Oral daily  atorvastatin 40 milliGRAM(s) Oral at bedtime  clopidogrel Tablet 75 milliGRAM(s) Oral daily  dextrose 5%. 1000 milliLiter(s) (50 mL/Hr) IV Continuous <Continuous>  dextrose 50% Injectable 12.5 Gram(s) IV Push once  dextrose 50% Injectable 25 Gram(s) IV Push once  dextrose 50% Injectable 25 Gram(s) IV Push once  DULoxetine 60 milliGRAM(s) Oral daily  finasteride 5 milliGRAM(s) Oral daily  heparin  Injectable 7500 Unit(s) SubCutaneous every 8 hours  insulin lispro (HumaLOG) corrective regimen sliding scale   SubCutaneous Before meals and at bedtime  lisinopril 20 milliGRAM(s) Oral two times a day  metoprolol     tartrate 25 milliGRAM(s) Oral two times a day  modafinil 200 milliGRAM(s) Oral two times a day  oxybutynin 5 milliGRAM(s) Oral two times a day  pantoprazole    Tablet 40 milliGRAM(s) Oral before breakfast  pregabalin 100 milliGRAM(s) Oral daily  pregabalin 200 milliGRAM(s) Oral at bedtime  tamsulosin 0.4 milliGRAM(s) Oral at bedtime  triamterene 37.5 mG/hydrochlorothiazide 25 mG Capsule 1 Capsule(s) Oral daily  vancomycin  IVPB 1500 milliGRAM(s) IV Intermittent every 24 hours    MEDICATIONS  (PRN):  dextrose Gel 1 Dose(s) Oral once PRN Blood Glucose LESS THAN 70 milliGRAM(s)/deciliter  glucagon  Injectable 1 milliGRAM(s) IntraMuscular once PRN Glucose LESS THAN 70 milligrams/deciliter      ALLERGIES:  Allergies    Bactrim (Headache)  Sular (Other)    Intolerances    Cipro (Headache)  vancomycin (Red Man Synd)      LABS:                        12.4   4.8   )-----------( 142      ( 18 Dec 2017 08:18 )             38.3     12-18    135  |  101  |  17  ----------------------------<  129<H>  3.4<L>   |  22  |  1.04    Ca    7.7<L>      18 Dec 2017 08:18  Mg     1.8         TPro  7.6  /  Alb  4.2  /  TBili  0.6  /  DBili  x   /  AST  29  /  ALT  37  /  AlkPhos  59  12-17    PT/INR - ( 17 Dec 2017 15:31 )   PT: 11.1 sec;   INR: 1.00          PTT - ( 17 Dec 2017 15:31 )  PTT:28.8 sec  Urinalysis Basic - ( 17 Dec 2017 20:08 )    Color: Yellow / Appearance: Clear / S.010 / pH: x  Gluc: x / Ketone: NEGATIVE  / Bili: Negative / Urobili: 0.2 E.U./dL   Blood: x / Protein: NEGATIVE mg/dL / Nitrite: NEGATIVE   Leuk Esterase: NEGATIVE / RBC: 5-10 /HPF / WBC < 5 /HPF   Sq Epi: x / Non Sq Epi: 0-5 /HPF / Bacteria: Present /HPF      CAPILLARY BLOOD GLUCOSE      POCT Blood Glucose.: 173 mg/dL (18 Dec 2017 21:19)

## 2017-12-19 NOTE — CONSULT NOTE ADULT - PROBLEM SELECTOR RECOMMENDATION 9
Patient with recent history of fevers and chills with open wound with surround area of cellulitis that is responding well to antibiotic therapy. Patient found to be bacteremic with gram positive cocci in clusters, though PCR not detecting speciation. Lab repeated plating and PCR which again confirmed Gr+ cocci in clusters with no clear PCR match.  - c/w Vancomycin at 1500mg Q24H with trough prior to fourth dose - adjust as needed  - Will follow-up speciation of current cultures  - Please obtain surveillance cultures Q48H to ensure clearing bacteremia  - Per collateral obtained from patient's PMD, patient should not be immunosuppressed - unclear if Acyclovir indicated  - ID will continue to follow  - Case discussed with Dr. Washington and primary team

## 2017-12-19 NOTE — CHART NOTE - NSCHARTNOTEFT_GEN_A_CORE
RN notified primary medical team that patient fell at bedside while trying to go to the bathroom. Per patient, he was getting out of bed to use the bathroom when he slipped on a wet floor- patient was not wearing socks. Fall was witnessed by spouse, state he hit his head on the floor. Pt did not lose consciousness, no full body shaking, post ictal; patient was alert and oriented throughout event. No focal deficits or new bruising noted. Pt was lifted back into bed.     VSS     Given head trauma, will order head CT after confirming that none of the patient's internal hardware is incompatible with study. RN notified primary medical team that patient fell at bedside while trying to go to the bathroom. Per patient, he was getting out of bed to use the bathroom when he slipped on a wet floor- patient was not wearing socks. Fall was witnessed by spouse, state he hit his head on the floor. Pt did not lose consciousness, no full body shaking, post ictal; patient was alert and oriented throughout event. No focal deficits or new bruising noted. Pt was lifted back into bed.     VSS T98.6 136/76 HR 68 RR 18 (94% on RA)    Given head trauma, will order head CT STAT after confirming that the patient's internal hardware is compatible with study. RN notified primary medical team that patient fell at bedside while trying to go to the bathroom. Per patient, he was getting out of bed to use the bathroom when he slipped on a wet floor- patient was not wearing socks. Fall was witnessed by spouse, state he hit his head on the floor. Pt did not lose consciousness, no full body shaking, tongue biting or postictal; patient was alert and oriented throughout event. No focal deficits or new bruising noted. Pt was lifted back into bed and vitals taken. Patient urinated on himself during event.    VSS T98.6 136/76 HR 68 RR 18 (94% on RA)    Given head trauma, will order head CT STAT after confirming that the patient's internal hardware is compatible with study. RN notified primary medical team that patient fell at bedside while trying to go to the bathroom; medical team was at bedise <1 min of notification. Per patient, he was getting out of bed to use the bathroom when he slipped on a wet floor- patient was not wearing socks. Fall was witnessed by spouse, state he hit his head on the floor. Pt did not lose consciousness, no full body shaking, tongue biting or postictal; patient was alert and oriented throughout event. No focal deficits or new bruising noted. Pt was lifted back into bed and vitals taken. Patient urinated on himself during event.    VSS T98.6 136/76 HR 68 RR 18 (94% on RA)    Given head trauma, will order head CT STAT after confirming that the patient's internal hardware is compatible with study.

## 2017-12-19 NOTE — PROGRESS NOTE ADULT - PROBLEM SELECTOR PLAN 8
Patient with DMII (A1c of 6.2) but c/b diabetic neuropathy. Home metformin HCl 500mg BID held on admission. -160  - Insulin sliding scale  - Consistent carb diabetic diet    # Obstructive Sleep Apnea (EDELMIRA): pt w/ CPAP at night  - CPAP O/N

## 2017-12-19 NOTE — PROGRESS NOTE ADULT - PROBLEM SELECTOR PLAN 1
Pt w/ severe sepsis criteria (since resolved) with T 101.6F HR 93 Lactate 2.3 likely 2/2 to cellulitis, pt w/ h/o MRSA cellulitis c/b sepsis. Blood Cultures: NGTD. CXR no acute infil UA negative. Pt febrile O/N, VSS. WBC continues to be WNL, likely 2/2 on going cellulitis infection currently being managed w/ IV ABx  - c/w Vanc 1.5g IVP q24 with trough before 4th dose - ensure to run slowly with concern for red man syndrome.   - s/p IVF resuscitation with goal 30cc/Kg on initial presentation, tolerating PO intake.   - Tylenol 650mg PRN for T>38C  - continue to monitor Pt with one anaerobic bottle of initial set + for gram + cocci in clusters. Blood panel PCR results didn't identify organism- confirmed with laboratory likely speciation tomorrow. Of note, patient with R Central Line >7years for MM/Access (unclear reason) and DRG stimulator in back. Despite local cellulitis improvement, no leukocytosis, patient continues to be febrile.   - c/w Vancomycin, lab also confirmed likely sensitive  - ID consult  - per collateral Dr. Sridhar Woo of [facility] ([phone]), patient has been in remission with no identifiable reason to be immunocompromised at this time.   - surveillance cultures  - In setting persistence fevers, consider to TTE to r/o endocarditis

## 2017-12-19 NOTE — PROGRESS NOTE ADULT - PROBLEM SELECTOR PLAN 3
Patient with Lactate of 2.3 on admission, since RESOLVED (1.3) s/p 3L Normal Saline. Normal anion gap. Pt with one anaerobic bottle of initial set + for gram + cocci in clusters. Blood panel PCR results didn't identify organism- confirmed with laboratory likely speciation tomorrow. Of note, patient with R Central Line >7years for MM/Access (unclear reason) and DRG stimulator in back. Despite local cellulitis improvement, no leukocytosis, patient continues to be febrile.   - c/w Vancomycin, lab also confirmed likely sensitive  - ID consult  - per collateral Dr. Sridhar Woo of [facility] ([phone]), patient has been in remission with no identifiable reason to be immunocompromised at this time.   - surveillance cultures  - In setting persistence fevers, consider to TTE to r/o endocarditis Pt with clinical h/o MRSA cellulitis c/b sepsis >5 times. Pt w/ DM neuropathy and multiple lesions on the LE. S/S of L LE cellulitis on exam, non-purulent.   - c/w Vanc 1.5g IVP q24 with trough before 4th dose - ensure to run slowly with concern for red man syndrome.   - continue to monitor and consider wound consult
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
## 2017-12-20 LAB
ANION GAP SERPL CALC-SCNC: 14 MMOL/L — SIGNIFICANT CHANGE UP (ref 5–17)
BUN SERPL-MCNC: 18 MG/DL — SIGNIFICANT CHANGE UP (ref 7–23)
CALCIUM SERPL-MCNC: 8.2 MG/DL — LOW (ref 8.4–10.5)
CHLORIDE SERPL-SCNC: 102 MMOL/L — SIGNIFICANT CHANGE UP (ref 96–108)
CO2 SERPL-SCNC: 23 MMOL/L — SIGNIFICANT CHANGE UP (ref 22–31)
CREAT SERPL-MCNC: 1.06 MG/DL — SIGNIFICANT CHANGE UP (ref 0.5–1.3)
GLUCOSE BLDC GLUCOMTR-MCNC: 135 MG/DL — HIGH (ref 70–99)
GLUCOSE BLDC GLUCOMTR-MCNC: 142 MG/DL — HIGH (ref 70–99)
GLUCOSE BLDC GLUCOMTR-MCNC: 146 MG/DL — HIGH (ref 70–99)
GLUCOSE BLDC GLUCOMTR-MCNC: 183 MG/DL — HIGH (ref 70–99)
GLUCOSE SERPL-MCNC: 137 MG/DL — HIGH (ref 70–99)
HCT VFR BLD CALC: 37.8 % — LOW (ref 39–50)
HGB BLD-MCNC: 12.4 G/DL — LOW (ref 13–17)
MAGNESIUM SERPL-MCNC: 2.1 MG/DL — SIGNIFICANT CHANGE UP (ref 1.6–2.6)
MCHC RBC-ENTMCNC: 29.7 PG — SIGNIFICANT CHANGE UP (ref 27–34)
MCHC RBC-ENTMCNC: 32.8 G/DL — SIGNIFICANT CHANGE UP (ref 32–36)
MCV RBC AUTO: 90.6 FL — SIGNIFICANT CHANGE UP (ref 80–100)
PLATELET # BLD AUTO: 170 K/UL — SIGNIFICANT CHANGE UP (ref 150–400)
POTASSIUM SERPL-MCNC: 3.8 MMOL/L — SIGNIFICANT CHANGE UP (ref 3.5–5.3)
POTASSIUM SERPL-SCNC: 3.8 MMOL/L — SIGNIFICANT CHANGE UP (ref 3.5–5.3)
RBC # BLD: 4.17 M/UL — LOW (ref 4.2–5.8)
RBC # FLD: 14.3 % — SIGNIFICANT CHANGE UP (ref 10.3–16.9)
SODIUM SERPL-SCNC: 139 MMOL/L — SIGNIFICANT CHANGE UP (ref 135–145)
VANCOMYCIN TROUGH SERPL-MCNC: 7 UG/ML — LOW (ref 10–20)
WBC # BLD: 6.4 K/UL — SIGNIFICANT CHANGE UP (ref 3.8–10.5)
WBC # FLD AUTO: 6.4 K/UL — SIGNIFICANT CHANGE UP (ref 3.8–10.5)

## 2017-12-20 PROCEDURE — 99233 SBSQ HOSP IP/OBS HIGH 50: CPT | Mod: GC

## 2017-12-20 PROCEDURE — 99232 SBSQ HOSP IP/OBS MODERATE 35: CPT | Mod: GC

## 2017-12-20 RX ORDER — ACETAMINOPHEN 500 MG
650 TABLET ORAL ONCE
Qty: 0 | Refills: 0 | Status: COMPLETED | OUTPATIENT
Start: 2017-12-20 | End: 2017-12-20

## 2017-12-20 RX ORDER — LABETALOL HCL 100 MG
10 TABLET ORAL ONCE
Qty: 0 | Refills: 0 | Status: COMPLETED | OUTPATIENT
Start: 2017-12-20 | End: 2017-12-21

## 2017-12-20 RX ORDER — HYDROCHLOROTHIAZIDE 25 MG
12.5 TABLET ORAL ONCE
Qty: 0 | Refills: 0 | Status: COMPLETED | OUTPATIENT
Start: 2017-12-20 | End: 2017-12-20

## 2017-12-20 RX ADMIN — DULOXETINE HYDROCHLORIDE 60 MILLIGRAM(S): 30 CAPSULE, DELAYED RELEASE ORAL at 12:18

## 2017-12-20 RX ADMIN — Medication 25 MILLIGRAM(S): at 07:08

## 2017-12-20 RX ADMIN — Medication 5 MILLIGRAM(S): at 18:03

## 2017-12-20 RX ADMIN — HEPARIN SODIUM 7500 UNIT(S): 5000 INJECTION INTRAVENOUS; SUBCUTANEOUS at 14:06

## 2017-12-20 RX ADMIN — Medication 400 MILLIGRAM(S): at 18:03

## 2017-12-20 RX ADMIN — LISINOPRIL 20 MILLIGRAM(S): 2.5 TABLET ORAL at 18:03

## 2017-12-20 RX ADMIN — Medication 25 MILLIGRAM(S): at 18:03

## 2017-12-20 RX ADMIN — Medication 650 MILLIGRAM(S): at 03:58

## 2017-12-20 RX ADMIN — Medication 81 MILLIGRAM(S): at 12:18

## 2017-12-20 RX ADMIN — PANTOPRAZOLE SODIUM 40 MILLIGRAM(S): 20 TABLET, DELAYED RELEASE ORAL at 07:08

## 2017-12-20 RX ADMIN — Medication 100 MILLIGRAM(S): at 12:18

## 2017-12-20 RX ADMIN — HEPARIN SODIUM 7500 UNIT(S): 5000 INJECTION INTRAVENOUS; SUBCUTANEOUS at 07:07

## 2017-12-20 RX ADMIN — CLOPIDOGREL BISULFATE 75 MILLIGRAM(S): 75 TABLET, FILM COATED ORAL at 12:18

## 2017-12-20 RX ADMIN — MODAFINIL 200 MILLIGRAM(S): 200 TABLET ORAL at 07:08

## 2017-12-20 RX ADMIN — Medication 650 MILLIGRAM(S): at 04:58

## 2017-12-20 RX ADMIN — FINASTERIDE 5 MILLIGRAM(S): 5 TABLET, FILM COATED ORAL at 12:18

## 2017-12-20 RX ADMIN — Medication 650 MILLIGRAM(S): at 22:50

## 2017-12-20 RX ADMIN — Medication 200 MILLIGRAM(S): at 21:51

## 2017-12-20 RX ADMIN — Medication 1 CAPSULE(S): at 07:08

## 2017-12-20 RX ADMIN — Medication 400 MILLIGRAM(S): at 07:08

## 2017-12-20 RX ADMIN — HEPARIN SODIUM 7500 UNIT(S): 5000 INJECTION INTRAVENOUS; SUBCUTANEOUS at 21:51

## 2017-12-20 RX ADMIN — Medication 300 MILLIGRAM(S): at 19:45

## 2017-12-20 RX ADMIN — TAMSULOSIN HYDROCHLORIDE 0.4 MILLIGRAM(S): 0.4 CAPSULE ORAL at 21:50

## 2017-12-20 RX ADMIN — Medication 5 MILLIGRAM(S): at 07:08

## 2017-12-20 RX ADMIN — Medication 650 MILLIGRAM(S): at 21:50

## 2017-12-20 RX ADMIN — Medication 650 MILLIGRAM(S): at 00:02

## 2017-12-20 RX ADMIN — LISINOPRIL 20 MILLIGRAM(S): 2.5 TABLET ORAL at 07:08

## 2017-12-20 RX ADMIN — ATORVASTATIN CALCIUM 40 MILLIGRAM(S): 80 TABLET, FILM COATED ORAL at 21:50

## 2017-12-20 RX ADMIN — Medication 1: at 21:50

## 2017-12-20 RX ADMIN — Medication 12.5 MILLIGRAM(S): at 23:51

## 2017-12-20 NOTE — DIETITIAN INITIAL EVALUATION ADULT. - OTHER INFO
73 yo/male with PMHx multiple myeloma, DM, MRSA bacteremia, TIA, HTN, HLD, admitted with general sickness, N/V, found to have sepsis 2/2 MRSA. Hospital course c/b mechanical fall in room. Pt reports appetite was poor PTA 2/2 feeling sick, though normally good. Pt and his wife have been using [employer] meal kit service and pt is enjoying it, feels that he is eating much healthier since. Has lost 35# over the last year, which he attributes to healthier eating. Current appetite reported as improving; No N/V/C/D reported at this time. NKFA. No difficulty chewing or swallowing. last BM 12/19. Skin intact; noted with minor LE cellulitis. Reviewed Consistent Carbohydrate Diet with pt; questions answered.

## 2017-12-20 NOTE — PROGRESS NOTE ADULT - ASSESSMENT
72yM with PMH of MM s/p chemo (>11yo), DM (A1c6) c/b peripheral neuropathy MRSA cellulitis, TIA x2 on ASA/Plx, HTN, HLD, BPH w/ urge incontinence, Chronic BP with DRG stimulator presented from home with c/p of worsening L LE swelling, redness, pain and generalized weakness a/w cellulitis, c/b severe sepsis (since resolved) currently on IV Abx (Vanc). Hospital course c/b by mechanical fall, CTH head.

## 2017-12-20 NOTE — PROGRESS NOTE ADULT - ASSESSMENT
Patient is a 71yo M with a PMHx of MM s/p chemo, DM with peripheral neuropathy and recurrent episodes of MRSA cellulitis 2/2 poor wound healing, TIA x2, HTN, HLD, BPH, chronic back pain with DRG stimulator who presented complaining of worsening LLE redness and pain with generalized weakness and fever, found to be bacteremic with unknown Gr+ cocci without PCR correlate

## 2017-12-20 NOTE — DIETITIAN INITIAL EVALUATION ADULT. - PROBLEM SELECTOR PLAN 9
Known BPH.  - c/w Tamsulosin 0.4mg, Dutasteride 0.5mg PO daily     # urge incontinence: c/w home darifenacin ER 7.5mg PO d and Myrbetriq ER 25mg

## 2017-12-20 NOTE — PROGRESS NOTE ADULT - PROBLEM SELECTOR PLAN 1
Official recommendations pending discussion with Dr. Washington Lab repeated plating and PCR which again confirmed Gr+ cocci in clusters with no clear PCR match.  - c/w Vancomycin at 1500mg Q24H with trough prior to fourth dose - adjust as needed  - Will follow-up speciation of current cultures - still pending  - Repeat cultures NGTD, will continue to monitor  - ID will continue to follow  - Case discussed with Dr. Washington and primary team

## 2017-12-20 NOTE — DIETITIAN INITIAL EVALUATION ADULT. - ENERGY NEEDS
Height 68"; #; #; 156%IBW  BMI 36.5  Ideal body weight used for calculations as pt >120% of IBW. Needs estimated 2/2 age

## 2017-12-20 NOTE — PROGRESS NOTE ADULT - SUBJECTIVE AND OBJECTIVE BOX
OVERNIGHT EVENTS: SHARON overnight  Remained Afebrile   Refused CPAP 2/2 to "making him dry"    CTH: No acute intracranial abnormality or calvarial fracture.    SUBJECTIVE / INTERVAL HPI: Patient seen and examined at bedside.   Pt without complaints at this time, able to sleep well. Notes improvement in "general" feeling.   No HA NV photo/phonophobia, no weakness, change in vision, focal neurologic symptoms he could identify  w/o chest pain.       VITAL SIGNS:  Vital Signs Last 24 Hrs  T(C): 37 (20 Dec 2017 05:25), Max: 37.1 (19 Dec 2017 21:44)  T(F): 98.6 (20 Dec 2017 05:25), Max: 98.8 (19 Dec 2017 21:44)  HR: 80 (20 Dec 2017 05:25) (64 - 80)  BP: 155/81 (20 Dec 2017 05:25) (136/76 - 165/84)  BP(mean): --  RR: 17 (20 Dec 2017 05:25) (17 - 20)  SpO2: 95% (20 Dec 2017 05:25) (94% - 98%)    PHYSICAL EXAM:    General: NAD, patient lying in bed comfortably.  HEENT: NC/AT; PERRL, anicteric sclera; MMM  Neck: supple  Cardiovascular: +S1/S2; RRR  Respiratory: CTA B/L; no W/R/R, mild cough.   Gastrointestinal: protuberant soft, NT/ND; +BSx4. Well healed surgical scar w/ non-tender abd hernia  Extremities: WWP; no edema, clubbing or cyanosis. LLE: one 3cm, nonpurulent, scabbed cut with surrounding erythema- slightly improved since yesterday. Area erythema improved, non-tender to palpation but warm to touch. Decreased sensation in both LE bilaterally.   Vascular: 2+ radial, DP/PT pulses b/l  Neurological: AAOx3; CN I-XII intact no focal neuro deficits      MEDICATIONS:  MEDICATIONS  (STANDING):  acyclovir   Tablet 400 milliGRAM(s) Oral two times a day  aspirin enteric coated 81 milliGRAM(s) Oral daily  atorvastatin 40 milliGRAM(s) Oral at bedtime  clopidogrel Tablet 75 milliGRAM(s) Oral daily  dextrose 5%. 1000 milliLiter(s) (50 mL/Hr) IV Continuous <Continuous>  dextrose 50% Injectable 12.5 Gram(s) IV Push once  dextrose 50% Injectable 25 Gram(s) IV Push once  dextrose 50% Injectable 25 Gram(s) IV Push once  DULoxetine 60 milliGRAM(s) Oral daily  finasteride 5 milliGRAM(s) Oral daily  heparin  Injectable 7500 Unit(s) SubCutaneous every 8 hours  insulin lispro (HumaLOG) corrective regimen sliding scale   SubCutaneous Before meals and at bedtime  lisinopril 20 milliGRAM(s) Oral two times a day  metoprolol     tartrate 25 milliGRAM(s) Oral two times a day  modafinil 200 milliGRAM(s) Oral two times a day  oxybutynin 5 milliGRAM(s) Oral two times a day  pantoprazole    Tablet 40 milliGRAM(s) Oral before breakfast  pregabalin 100 milliGRAM(s) Oral daily  pregabalin 200 milliGRAM(s) Oral at bedtime  tamsulosin 0.4 milliGRAM(s) Oral at bedtime  triamterene 37.5 mG/hydrochlorothiazide 25 mG Capsule 1 Capsule(s) Oral daily  vancomycin  IVPB 1500 milliGRAM(s) IV Intermittent every 24 hours    MEDICATIONS  (PRN):  dextrose Gel 1 Dose(s) Oral once PRN Blood Glucose LESS THAN 70 milliGRAM(s)/deciliter  glucagon  Injectable 1 milliGRAM(s) IntraMuscular once PRN Glucose LESS THAN 70 milligrams/deciliter      ALLERGIES:  Allergies    Bactrim (Headache)  Sular (Other)    Intolerances    Cipro (Headache)  vancomycin (Red Man Synd)      LABS:                        12.7   6.0   )-----------( 157      ( 19 Dec 2017 08:16 )             39.0     12-19    139  |  101  |  15  ----------------------------<  124<H>  3.8   |  24  |  1.06    Ca    8.5      19 Dec 2017 08:16  Mg     2.4     12-19    CAPILLARY BLOOD GLUCOSE  POCT Blood Glucose.: 142 mg/dL (20 Dec 2017 07:51)  RADIOLOGY & ADDITIONAL TESTS: Reviewed.    HEAD CT WITHOUT CONTRAST     HISTORY: Head trauma status post mechanical fall. Additional history of   bacteremia and multiple myeloma.  FINDINGS:     There is age-related generalized parenchymal volume loss without   hydrocephalus. There is prominence of the bilateral frontal horns likely   related to a component of central volume loss. The gray-white matter   differentiation is preserved. Nonspecific ill-defined hypodensities in   the supratentorial white matter are suggestive of mild chronic   microvascular ischemic disease. There is no hemorrhage or mass lesion. No   extra-axial fluid collection, subfalcine herniation, or midline shift is   visualized.   The calvarium is intact. No aggressive calvarial lesion is identified.   The visualized paranasal sinuses are clear. There is nonspecific   opacification of the bilateral caudal mastoid air cells.    IMPRESSION:  No acute intracranial abnormality or calvarial fracture.

## 2017-12-20 NOTE — PROGRESS NOTE ADULT - PROBLEM SELECTOR PLAN 1
Pt with one anaerobic bottle of initial set + for gram + cocci in clusters. Blood panel PCR results didn't identify organism- confirmed with laboratory likely speciation tomorrow. Of note, patient with R Central Line >7years for MM/Access (unclear reason) and DRG stimulator in back. Despite local cellulitis improvement, no leukocytosis, patient continues to be febrile. Surveillance BCx NG12h  - c/w Vancomycin, lab also confirmed likely sensitive  - ID :  f/u speciation, Bcx q48h - will continue to follow  - per collateral Dr. Sridhar Woo of [facility] ([phone]), patient has been in remission with no identifiable reason to be immunocompromised at this time.   - In setting persistence fevers, consider to TTE to r/o endocarditis

## 2017-12-20 NOTE — DIETITIAN INITIAL EVALUATION ADULT. - PROBLEM SELECTOR PLAN 1
Pt w/ sepsis criteria with T 101.6F HR 93 Lactate 2.3 likely 2/2 to cellulitis, pt w/o h/o MRSA cellulitis c/b sepsis. Blood Cultures collected, pending. Given recent cough, CX for r/o PNA.   - s/p Vanc 1g ED - given weight based dosing, give 500mg IVP now and then plan for Vanc 1.5mg IVP q24 with trough before 4th dose - ensure to run slowly with concern for red man syndrome.   - IVF resuscitation with goal 30cc/Kg  - f/u CXR, UA, BCx  - Tylenol 650mg PRN for T>38C  - continue to monitor

## 2017-12-20 NOTE — PROGRESS NOTE ADULT - SUBJECTIVE AND OBJECTIVE BOX
INTERVAL HPI/OVERNIGHT EVENTS: Patient seen and examined at bedside. Patient reporting some mild pain in the neck s/p mechanical fall yesterday afternoon. Patient otherwise denies fevers, chills, cough, SOB, CP, palpitations, abdominal pain, n/v/d/c, dysuria. States the area of erythema of the LLE has improved.    CONSTITUTIONAL:  Neck pain s/p mechanical fall yesterday  EYES:  Negative  blurry vision or double vision  CARDIOVASCULAR:  Negative for chest pain or palpitations  RESPIRATORY:  Negative for cough, wheezing, or SOB   GASTROINTESTINAL:  Negative for nausea, vomiting, diarrhea, constipation, or abdominal pain  GENITOURINARY:  Negative frequency, urgency or dysuria  NEUROLOGIC:  No headache, confusion, dizziness, lightheadedness      ANTIBIOTICS/RELEVANT:    MEDICATIONS  (STANDING):  acyclovir   Tablet 400 milliGRAM(s) Oral two times a day  aspirin enteric coated 81 milliGRAM(s) Oral daily  atorvastatin 40 milliGRAM(s) Oral at bedtime  clopidogrel Tablet 75 milliGRAM(s) Oral daily  dextrose 5%. 1000 milliLiter(s) (50 mL/Hr) IV Continuous <Continuous>  dextrose 50% Injectable 12.5 Gram(s) IV Push once  dextrose 50% Injectable 25 Gram(s) IV Push once  dextrose 50% Injectable 25 Gram(s) IV Push once  DULoxetine 60 milliGRAM(s) Oral daily  finasteride 5 milliGRAM(s) Oral daily  heparin  Injectable 7500 Unit(s) SubCutaneous every 8 hours  insulin lispro (HumaLOG) corrective regimen sliding scale   SubCutaneous Before meals and at bedtime  lisinopril 20 milliGRAM(s) Oral two times a day  metoprolol     tartrate 25 milliGRAM(s) Oral two times a day  modafinil 200 milliGRAM(s) Oral two times a day  oxybutynin 5 milliGRAM(s) Oral two times a day  pantoprazole    Tablet 40 milliGRAM(s) Oral before breakfast  pregabalin 100 milliGRAM(s) Oral daily  pregabalin 200 milliGRAM(s) Oral at bedtime  tamsulosin 0.4 milliGRAM(s) Oral at bedtime  triamterene 37.5 mG/hydrochlorothiazide 25 mG Capsule 1 Capsule(s) Oral daily  vancomycin  IVPB 1500 milliGRAM(s) IV Intermittent every 24 hours    MEDICATIONS  (PRN):  dextrose Gel 1 Dose(s) Oral once PRN Blood Glucose LESS THAN 70 milliGRAM(s)/deciliter  glucagon  Injectable 1 milliGRAM(s) IntraMuscular once PRN Glucose LESS THAN 70 milligrams/deciliter        Vital Signs Last 24 Hrs  T(C): 36.2 (20 Dec 2017 09:20), Max: 37.1 (19 Dec 2017 21:44)  T(F): 97.2 (20 Dec 2017 09:20), Max: 98.8 (19 Dec 2017 21:44)  HR: 64 (20 Dec 2017 09:20) (64 - 80)  BP: 157/86 (20 Dec 2017 09:20) (136/76 - 165/84)  BP(mean): --  RR: 18 (20 Dec 2017 09:20) (17 - 20)  SpO2: 94% (20 Dec 2017 09:20) (94% - 98%)    PHYSICAL EXAM:  Constitutional: Obese elderly male in NAD  Eyes: SIERRA, EOMI  Ear/Nose/Throat: no oral lesion, no sinus tenderness on percussion	  Neck: no JVD, no lymphadenopathy, supple, no nuchal rigidity  Respiratory: CTA shante  Cardiovascular: S1S2 RRR, no murmurs  Gastrointestinal: Obese with scar in RUQ with prominent and reducible hernia, +bs, NTND  Extremities: RUE with port in forearm without any erythema, induration, or fluctuance; LLE with 1cm excoriation, scabbed, with 2cm surrounding erythema with minimal induration, no fluctuance, receding from marked margins; multiple toes with small abrasions, none of which appear erythematous, purulent, or drianing  Vascular: DP Pulse:	right normal; left normal    LABS:                        12.4   6.4   )-----------( 170      ( 20 Dec 2017 08:27 )             37.8     12-20    139  |  102  |  18  ----------------------------<  137<H>  3.8   |  23  |  1.06    Ca    8.2<L>      20 Dec 2017 08:27  Mg     2.1     12-20            MICROBIOLOGY:    Culture - Blood (collected 19 Dec 2017 16:27)  Source: .Blood Blood-Venous  Preliminary Report (20 Dec 2017 05:01):    No growth at 12 hours    Culture - Blood (collected 19 Dec 2017 16:27)  Source: .Blood Blood-Venous  Preliminary Report (20 Dec 2017 05:01):    No growth at 12 hours    Culture - Urine (collected 17 Dec 2017 21:35)  Source: .Urine Clean Catch (Midstream)  Final Report (18 Dec 2017 10:43):    Less than 10,000 cols/cc; Insignificant amount of growth.    Culture - Blood (collected 17 Dec 2017 16:21)  Source: .Blood Blood  Gram Stain (19 Dec 2017 09:06):    Anaerobic Bottle: Gram Positive Cocci in Clusters    Result called to and read back bySonal Bucio RN  12/19/2017 09:05:55    ***Blood Panel PCR results on this specimen are available    approximately 3 hours after the Gram stain result.***    Gram stain,PCR, and/or culture results may not always    correspond due to difference in methodologies.    ************************************************************    This PCR assay was performed using [employer].    The following targets are tested for: Enterococcus,    vancomycin resistant enterococci, Listeria monocytogenes,    coagulase negative staphylococci, S. aureus,    methicillin resistant S. aureus, Streptococcus agalactiae    (Group B), S. pneumoniae, S. pyogenes (Group A),    Acinetobacter baumannii, Enterobacter cloacae, E. coli,    Klebsiella oxytoca, K. pneumoniae, Proteus sp.,    Serratia marcescens, Haemophilus influenzae,    Neisseria meningitidis, Pseudomonas aeruginosa, Candida    albicans, C. glabrata, C krusei, C parapsilosis,    C. tropicalis andthe KPC resistance gene.  Preliminary Report (19 Dec 2017 09:17):    Culture in progress  Organism: Blood Culture PCR (19 Dec 2017 09:34)  Organism: Blood Culture PCR (19 Dec 2017 09:34)    Culture - Blood (collected 17 Dec 2017 16:21)  Source: .Blood Blood  Preliminary Report (19 Dec 2017 17:01):    No growth at 2 days.      RADIOLOGY & ADDITIONAL STUDIES:  Reviewed

## 2017-12-20 NOTE — DIETITIAN INITIAL EVALUATION ADULT. - PROBLEM SELECTOR PLAN 6
Patient with reported HTN at home, on MTP 25mg BID, triamterene HCTZ 37.5/25mg daily. sBP 141-162. Last dose taken this AM.   - c/w MTP 25mg BID  - c/w lisinopril 20mg BID  -holding hctz-triamterene in setting of dry MM   # HLD   - c/w atorvastatin 40mg

## 2017-12-20 NOTE — PROGRESS NOTE ADULT - PROBLEM SELECTOR PLAN 2
Pt w/ severe sepsis criteria (since resolved) with T 101.6F HR 93 Lactate 2.3 likely 2/2 to cellulitis, pt w/ h/o MRSA cellulitis c/b sepsis. Blood Cultures: NGTD. CXR no acute infil UA negative. Pt febrile O/N, VSS. WBC continues to be WNL, likely 2/2 on going cellulitis infection currently being managed w/ IV ABx  - c/w Vanc 1.5g IVP q24 with trough before 4th dose - ensure to run slowly with concern for red man syndrome.   - s/p IVF resuscitation with goal 30cc/Kg on initial presentation, tolerating PO intake.     #Lactic Acidosis: RESOLVED patient w/ elevated lactate on admission to 2.3, since cleared after 3L IVF - repeat 1.3  - Tylenol 650mg PRN for T>38C  - continue to monitor

## 2017-12-20 NOTE — DIETITIAN INITIAL EVALUATION ADULT. - PROBLEM SELECTOR PLAN 7
Patient with h/o depression on duloxetine 60mg BID, Lyrica 100mg TID  - c/w home medications and continue to monitor  - c/w modafinil 200mg BID daily     #GERD: h/o GERD controlled with esomeprazole 40mg  - c/w pantoprazole 40mg PO d

## 2017-12-20 NOTE — PROGRESS NOTE ADULT - PROBLEM SELECTOR PLAN 8
Patient with DM- II (A1c of 6.2) but c/b diabetic neuropathy. Home metformin HCl 500mg BID held on admission. -160  - Insulin sliding scale  - Consistent carb diabetic diet    # Obstructive Sleep Apnea (EDELMIRA): pt w/ CPAP at night  - CPAP O/N

## 2017-12-20 NOTE — DIETITIAN INITIAL EVALUATION ADULT. - PROBLEM SELECTOR PLAN 8
Patient with DMII (reported A1c of 6) but c/b diabetic neuropathy. Home metformin HCl 500mg BID held on admission  - Insulin sliding scale  - Consistent carb diabetic diet    # Obstructive Sleep Apnea (EDELMIRA): pt w/ CPAP at night  - CPAP

## 2017-12-20 NOTE — PROGRESS NOTE ADULT - PROBLEM SELECTOR PLAN 6
Patient with reported HTN at home, on MTP 25mg BID, triamterene HCTZ 37.5/25mg daily. sBP 160-170s. Last dose taken this AM.   - c/w MTP 25mg BID  - c/w lisinopril 20mg BID  - c/w (restarted yesterday) hctz-triamterene    # HLD   - c/w atorvastatin 40mg

## 2017-12-20 NOTE — DIETITIAN INITIAL EVALUATION ADULT. - PROBLEM SELECTOR PLAN 4
Pt w/ h/o MM s/p chemo >12 years ago. Before treatment, immunosuppressed infections including PCP. Currently followed in [place] at [facility].   - c/w Acyclovir 400mg BID  - continue to monitor

## 2017-12-21 DIAGNOSIS — R78.81 BACTEREMIA: ICD-10-CM

## 2017-12-21 LAB
ANION GAP SERPL CALC-SCNC: 14 MMOL/L — SIGNIFICANT CHANGE UP (ref 5–17)
BLD GP AB SCN SERPL QL: NEGATIVE — SIGNIFICANT CHANGE UP
BUN SERPL-MCNC: 14 MG/DL — SIGNIFICANT CHANGE UP (ref 7–23)
CALCIUM SERPL-MCNC: 8.8 MG/DL — SIGNIFICANT CHANGE UP (ref 8.4–10.5)
CHLORIDE SERPL-SCNC: 100 MMOL/L — SIGNIFICANT CHANGE UP (ref 96–108)
CO2 SERPL-SCNC: 24 MMOL/L — SIGNIFICANT CHANGE UP (ref 22–31)
CREAT SERPL-MCNC: 0.93 MG/DL — SIGNIFICANT CHANGE UP (ref 0.5–1.3)
GLUCOSE BLDC GLUCOMTR-MCNC: 135 MG/DL — HIGH (ref 70–99)
GLUCOSE BLDC GLUCOMTR-MCNC: 141 MG/DL — HIGH (ref 70–99)
GLUCOSE BLDC GLUCOMTR-MCNC: 158 MG/DL — HIGH (ref 70–99)
GLUCOSE BLDC GLUCOMTR-MCNC: 213 MG/DL — HIGH (ref 70–99)
GLUCOSE SERPL-MCNC: 140 MG/DL — HIGH (ref 70–99)
HCT VFR BLD CALC: 40 % — SIGNIFICANT CHANGE UP (ref 39–50)
HGB BLD-MCNC: 13.4 G/DL — SIGNIFICANT CHANGE UP (ref 13–17)
MAGNESIUM SERPL-MCNC: 2.1 MG/DL — SIGNIFICANT CHANGE UP (ref 1.6–2.6)
MCHC RBC-ENTMCNC: 30 PG — SIGNIFICANT CHANGE UP (ref 27–34)
MCHC RBC-ENTMCNC: 33.5 G/DL — SIGNIFICANT CHANGE UP (ref 32–36)
MCV RBC AUTO: 89.7 FL — SIGNIFICANT CHANGE UP (ref 80–100)
PLATELET # BLD AUTO: 172 K/UL — SIGNIFICANT CHANGE UP (ref 150–400)
POTASSIUM SERPL-MCNC: 3.4 MMOL/L — LOW (ref 3.5–5.3)
POTASSIUM SERPL-SCNC: 3.4 MMOL/L — LOW (ref 3.5–5.3)
RBC # BLD: 4.46 M/UL — SIGNIFICANT CHANGE UP (ref 4.2–5.8)
RBC # FLD: 14.3 % — SIGNIFICANT CHANGE UP (ref 10.3–16.9)
RH IG SCN BLD-IMP: POSITIVE — SIGNIFICANT CHANGE UP
SODIUM SERPL-SCNC: 138 MMOL/L — SIGNIFICANT CHANGE UP (ref 135–145)
WBC # BLD: 5.4 K/UL — SIGNIFICANT CHANGE UP (ref 3.8–10.5)
WBC # FLD AUTO: 5.4 K/UL — SIGNIFICANT CHANGE UP (ref 3.8–10.5)

## 2017-12-21 PROCEDURE — 99232 SBSQ HOSP IP/OBS MODERATE 35: CPT | Mod: GC

## 2017-12-21 PROCEDURE — 93306 TTE W/DOPPLER COMPLETE: CPT | Mod: 26

## 2017-12-21 PROCEDURE — 99233 SBSQ HOSP IP/OBS HIGH 50: CPT

## 2017-12-21 RX ORDER — MODAFINIL 200 MG/1
200 TABLET ORAL DAILY
Qty: 0 | Refills: 0 | Status: DISCONTINUED | OUTPATIENT
Start: 2017-12-22 | End: 2017-12-23

## 2017-12-21 RX ORDER — VANCOMYCIN HCL 1 G
VIAL (EA) INTRAVENOUS
Qty: 0 | Refills: 0 | Status: DISCONTINUED | OUTPATIENT
Start: 2017-12-21 | End: 2017-12-21

## 2017-12-21 RX ORDER — POTASSIUM CHLORIDE 20 MEQ
40 PACKET (EA) ORAL EVERY 4 HOURS
Qty: 0 | Refills: 0 | Status: COMPLETED | OUTPATIENT
Start: 2017-12-21 | End: 2017-12-21

## 2017-12-21 RX ORDER — VANCOMYCIN HCL 1 G
1500 VIAL (EA) INTRAVENOUS EVERY 12 HOURS
Qty: 0 | Refills: 0 | Status: DISCONTINUED | OUTPATIENT
Start: 2017-12-21 | End: 2017-12-22

## 2017-12-21 RX ORDER — AMLODIPINE BESYLATE 2.5 MG/1
5 TABLET ORAL DAILY
Qty: 0 | Refills: 0 | Status: DISCONTINUED | OUTPATIENT
Start: 2017-12-21 | End: 2017-12-23

## 2017-12-21 RX ORDER — ACETAMINOPHEN 500 MG
650 TABLET ORAL ONCE
Qty: 0 | Refills: 0 | Status: COMPLETED | OUTPATIENT
Start: 2017-12-21 | End: 2017-12-21

## 2017-12-21 RX ADMIN — ATORVASTATIN CALCIUM 40 MILLIGRAM(S): 80 TABLET, FILM COATED ORAL at 22:40

## 2017-12-21 RX ADMIN — Medication 5 MILLIGRAM(S): at 06:33

## 2017-12-21 RX ADMIN — LISINOPRIL 20 MILLIGRAM(S): 2.5 TABLET ORAL at 06:32

## 2017-12-21 RX ADMIN — LISINOPRIL 20 MILLIGRAM(S): 2.5 TABLET ORAL at 18:31

## 2017-12-21 RX ADMIN — HEPARIN SODIUM 7500 UNIT(S): 5000 INJECTION INTRAVENOUS; SUBCUTANEOUS at 13:56

## 2017-12-21 RX ADMIN — Medication 250 MILLIGRAM(S): at 18:32

## 2017-12-21 RX ADMIN — Medication 81 MILLIGRAM(S): at 12:15

## 2017-12-21 RX ADMIN — Medication 25 MILLIGRAM(S): at 18:32

## 2017-12-21 RX ADMIN — MODAFINIL 200 MILLIGRAM(S): 200 TABLET ORAL at 12:15

## 2017-12-21 RX ADMIN — Medication 1 CAPSULE(S): at 06:33

## 2017-12-21 RX ADMIN — DULOXETINE HYDROCHLORIDE 60 MILLIGRAM(S): 30 CAPSULE, DELAYED RELEASE ORAL at 12:15

## 2017-12-21 RX ADMIN — Medication 400 MILLIGRAM(S): at 06:32

## 2017-12-21 RX ADMIN — Medication 400 MILLIGRAM(S): at 18:31

## 2017-12-21 RX ADMIN — Medication 250 MILLIGRAM(S): at 06:33

## 2017-12-21 RX ADMIN — Medication 2: at 13:55

## 2017-12-21 RX ADMIN — FINASTERIDE 5 MILLIGRAM(S): 5 TABLET, FILM COATED ORAL at 12:15

## 2017-12-21 RX ADMIN — PANTOPRAZOLE SODIUM 40 MILLIGRAM(S): 20 TABLET, DELAYED RELEASE ORAL at 06:32

## 2017-12-21 RX ADMIN — Medication 40 MILLIEQUIVALENT(S): at 13:56

## 2017-12-21 RX ADMIN — TAMSULOSIN HYDROCHLORIDE 0.4 MILLIGRAM(S): 0.4 CAPSULE ORAL at 22:40

## 2017-12-21 RX ADMIN — Medication 1: at 22:40

## 2017-12-21 RX ADMIN — Medication 25 MILLIGRAM(S): at 06:32

## 2017-12-21 RX ADMIN — Medication 40 MILLIEQUIVALENT(S): at 10:02

## 2017-12-21 RX ADMIN — Medication 650 MILLIGRAM(S): at 23:13

## 2017-12-21 RX ADMIN — HEPARIN SODIUM 7500 UNIT(S): 5000 INJECTION INTRAVENOUS; SUBCUTANEOUS at 06:33

## 2017-12-21 RX ADMIN — MODAFINIL 200 MILLIGRAM(S): 200 TABLET ORAL at 06:32

## 2017-12-21 RX ADMIN — Medication 5 MILLIGRAM(S): at 18:32

## 2017-12-21 RX ADMIN — CLOPIDOGREL BISULFATE 75 MILLIGRAM(S): 75 TABLET, FILM COATED ORAL at 12:15

## 2017-12-21 RX ADMIN — HEPARIN SODIUM 7500 UNIT(S): 5000 INJECTION INTRAVENOUS; SUBCUTANEOUS at 22:40

## 2017-12-21 RX ADMIN — AMLODIPINE BESYLATE 5 MILLIGRAM(S): 2.5 TABLET ORAL at 10:01

## 2017-12-21 RX ADMIN — Medication 10 MILLIGRAM(S): at 01:22

## 2017-12-21 RX ADMIN — Medication 200 MILLIGRAM(S): at 22:40

## 2017-12-21 NOTE — PROGRESS NOTE ADULT - PROBLEM SELECTOR PLAN 1
POA, d/t LLE cellulitis; clinically-improved; cont. IV Vanc fow now (day #5, infusing slowly, given h/o hypersensitivity), elevate leg, f/u cultures; lactic acidosis resolved; cellulitis is recurrent, h/o MRSA SSTIs, has outpatient Podiatry f/u for preventative care

## 2017-12-21 NOTE — PROGRESS NOTE ADULT - PROBLEM SELECTOR PLAN 1
Pt with one anaerobic bottle of initial set + for gram + cocci in clusters. Blood panel PCR results didn't identify organism- confirmed with laboratory likely speciation tomorrow. Of note, patient with R Central Line >7years for MM/Access (unclear reason) and DRG stimulator in back. Despite local cellulitis improvement, no leukocytosis, patient continues to be febrile. Surveillance BCx NG12h  - c/w Vancomycin; given Vanc trough 7, and CrC, can redose to 1750mg BID; ID following    - F/u speciation of original BCx - per lab, unkown organism likely S to Vanc  - per collateral Dr. Sridhar Woo of [facility] ([phone]), patient has been in remission with no identifiable reason to be immunocompromised at this time.

## 2017-12-21 NOTE — PROGRESS NOTE ADULT - PROBLEM SELECTOR PLAN 2
RESOLVED- Pt w/ severe sepsis criteria (since resolved) with T 101.6F HR 93 Lactate 2.3 likely 2/2 to cellulitis, pt w/ h/o MRSA cellulitis c/b sepsis. Blood Cultures: NGTD. CXR no acute infil UA negative. Pt febrile O/N, VSS. WBC continues to be WNL, likely 2/2 on going cellulitis infection currently being managed w/ IV ABx  - c/w Vancomycin with Red man Syndrome precautions  - s/p IVF resuscitation with goal 30cc/Kg on initial presentation, tolerating PO intake.     #Lactic Acidosis: RESOLVED patient w/ elevated lactate on admission to 2.3, since cleared after 3L IVF - repeat 1.3  - Tylenol 650mg PRN for T>38C  - continue to monitor

## 2017-12-21 NOTE — PROGRESS NOTE ADULT - ASSESSMENT
73yo M with a PMHx of MM s/p chemo, DM with peripheral neuropathy and recurrent episodes of MRSA cellulitis 2/2 poor wound healing, TIA x2, HTN, HLD, BPH, chronic back pain with DRG stimulator who presented complaining of worsening LLE redness and pain with generalized weakness and fever, found to be bacteremic with unknown Gr+ cocci without PCR correlate, currently asymptomatic with improvement in LLE lesion 71yo M with a PMHx of MM s/p chemo, DM with peripheral neuropathy and recurrent episodes of MRSA cellulitis 2/2 poor wound healing, TIA x2, HTN, HLD, BPH, chronic back pain with DRG stimulator who presented complaining of worsening LLE redness and pain with generalized weakness and fever, found to be bacteremic with what is now reported as Gemella species

## 2017-12-21 NOTE — PROGRESS NOTE ADULT - PROBLEM SELECTOR PLAN 2
1/4 bottles, possible contaminant, but may also be d/t SSTI; cont. IV vanc as above, ID following, checking TTE per recs; repeat BCx NGTD

## 2017-12-21 NOTE — PROGRESS NOTE ADULT - SUBJECTIVE AND OBJECTIVE BOX
Patient is a 72y old  Male who presents with a chief complaint of Cellulitis of the lower leg (18 Dec 2017 12:02)      INTERVAL HPI/OVERNIGHT EVENTS:    Pt. seen and examined at 11AM  Pt. has no new complaints  No F/C    Review of Systems: 12 point review of systems otherwise negative    MEDICATIONS  (STANDING):  acyclovir   Tablet 400 milliGRAM(s) Oral two times a day  amLODIPine   Tablet 5 milliGRAM(s) Oral daily  aspirin enteric coated 81 milliGRAM(s) Oral daily  atorvastatin 40 milliGRAM(s) Oral at bedtime  clopidogrel Tablet 75 milliGRAM(s) Oral daily  dextrose 5%. 1000 milliLiter(s) (50 mL/Hr) IV Continuous <Continuous>  dextrose 50% Injectable 12.5 Gram(s) IV Push once  dextrose 50% Injectable 25 Gram(s) IV Push once  dextrose 50% Injectable 25 Gram(s) IV Push once  DULoxetine 60 milliGRAM(s) Oral daily  finasteride 5 milliGRAM(s) Oral daily  heparin  Injectable 7500 Unit(s) SubCutaneous every 8 hours  insulin lispro (HumaLOG) corrective regimen sliding scale   SubCutaneous Before meals and at bedtime  lisinopril 20 milliGRAM(s) Oral two times a day  metoprolol     tartrate 25 milliGRAM(s) Oral two times a day  modafinil 200 milliGRAM(s) Oral two times a day  oxybutynin 5 milliGRAM(s) Oral two times a day  pantoprazole    Tablet 40 milliGRAM(s) Oral before breakfast  potassium chloride    Tablet ER 40 milliEquivalent(s) Oral every 4 hours  pregabalin 100 milliGRAM(s) Oral daily  pregabalin 200 milliGRAM(s) Oral at bedtime  tamsulosin 0.4 milliGRAM(s) Oral at bedtime  triamterene 37.5 mG/hydrochlorothiazide 25 mG Capsule 1 Capsule(s) Oral daily  vancomycin  IVPB 1500 milliGRAM(s) IV Intermittent every 12 hours    MEDICATIONS  (PRN):  dextrose Gel 1 Dose(s) Oral once PRN Blood Glucose LESS THAN 70 milliGRAM(s)/deciliter  glucagon  Injectable 1 milliGRAM(s) IntraMuscular once PRN Glucose LESS THAN 70 milligrams/deciliter      Allergies    Bactrim (Headache)  Sular (Other)    Intolerances    Cipro (Headache)  vancomycin (Red Man Synd)        Vital Signs Last 24 Hrs  T(C): 36.9 (21 Dec 2017 08:41), Max: 36.9 (21 Dec 2017 08:41)  T(F): 98.4 (21 Dec 2017 08:41), Max: 98.4 (21 Dec 2017 08:41)  HR: 67 (21 Dec 2017 08:41) (64 - 73)  BP: 176/89 (21 Dec 2017 08:41) (145/69 - 219/104)  BP(mean): --  RR: 16 (21 Dec 2017 08:44) (16 - 20)  SpO2: 96% (21 Dec 2017 08:44) (94% - 98%)  CAPILLARY BLOOD GLUCOSE      POCT Blood Glucose.: 213 mg/dL (21 Dec 2017 12:03)  POCT Blood Glucose.: 141 mg/dL (21 Dec 2017 07:46)  POCT Blood Glucose.: 183 mg/dL (20 Dec 2017 21:12)  POCT Blood Glucose.: 135 mg/dL (20 Dec 2017 17:21)      12-20 @ 07:01  -  12-21 @ 07:00  --------------------------------------------------------  IN: 1000 mL / OUT: 3350 mL / NET: -2350 mL    12-21 @ 07:01  -  12-21 @ 13:06  --------------------------------------------------------  IN: 0 mL / OUT: 450 mL / NET: -450 mL        Physical Exam:  (at 11AM)  Daily     Daily   General:  Well appearing, NAD, not cachetic  HEENT:  Nonicteric, PERRLA  CV:  RRR, no murmur, no JVD  Lungs:  CTA B/L, no wheezes, rales, rhonchi  Abdomen:  obese  Extremities: LLE w/ resolving erythema and edema  Skin:  WWP  :  +[place] cath  Neuro:  AAOx3    LABS:                        13.4   5.4   )-----------( 172      ( 21 Dec 2017 08:14 )             40.0     12-21    138  |  100  |  14  ----------------------------<  140<H>  3.4<L>   |  24  |  0.93    Ca    8.8      21 Dec 2017 08:14  Mg     2.1     12-21              RADIOLOGY & ADDITIONAL TESTS:    ---------------------------------------------------------------------------  I personally reviewed: [  ]EKG   [  ]CXR    [  ] CT    [  ]Other  ---------------------------------------------------------------------------  PLEASE CHECK WHEN PRESENT:     [  ]Heart Failure     [  ] Acute     [  ] Acute on Chronic     [  ] Chronic  -------------------------------------------------------------------     [  ]Diastolic [HFpEF]     [  ]Systolic [HFrEF]     [  ]Combined [HFpEF & HFrEF]     [  ]Other:  -------------------------------------------------------------------  [  ]ANNE     [  ]ATN     [  ]Reneal Medullary Necrosis     [  ]Renal Cortical Necrosis     [  ]Other Pathological Lesions:    [  ]CKD 1  [  ]CKD 2  [  ]CKD 3  [  ]CKD 4  [  ]CKD 5  [  ]Other  -------------------------------------------------------------------  [  ]Other/Unspecified:    --------------------------------------------------------------------    Abdominal Nutritional Status  [  ]Malnutrition: See Nutrition Note  [  ]Cachexia  [  ]Other:   [  ]Supplement Ordered:  [  ]Morbid Obesity (BMI >=40]

## 2017-12-21 NOTE — PROGRESS NOTE ADULT - SUBJECTIVE AND OBJECTIVE BOX
INTERVAL HPI/OVERNIGHT EVENTS: Patient seen and examined at bedside. Patient without complaints this morning stating he slept well overnight without fevers, chills, cough, SOB, CP, palpitations, n/v/d/c, abdominal pain, dysuria, skin rash.    CONSTITUTIONAL:  Negative fever or chills, feels well, good appetite  EYES:  Negative  blurry vision or double vision  CARDIOVASCULAR:  Negative for chest pain or palpitations  RESPIRATORY:  Negative for cough, wheezing, or SOB   GASTROINTESTINAL:  Negative for nausea, vomiting, diarrhea, constipation, or abdominal pain  GENITOURINARY:  Negative frequency, urgency or dysuria  NEUROLOGIC:  No headache, confusion, dizziness, lightheadedness      ANTIBIOTICS/RELEVANT:    MEDICATIONS  (STANDING):  acyclovir   Tablet 400 milliGRAM(s) Oral two times a day  amLODIPine   Tablet 5 milliGRAM(s) Oral daily  aspirin enteric coated 81 milliGRAM(s) Oral daily  atorvastatin 40 milliGRAM(s) Oral at bedtime  clopidogrel Tablet 75 milliGRAM(s) Oral daily  dextrose 5%. 1000 milliLiter(s) (50 mL/Hr) IV Continuous <Continuous>  dextrose 50% Injectable 12.5 Gram(s) IV Push once  dextrose 50% Injectable 25 Gram(s) IV Push once  dextrose 50% Injectable 25 Gram(s) IV Push once  DULoxetine 60 milliGRAM(s) Oral daily  finasteride 5 milliGRAM(s) Oral daily  heparin  Injectable 7500 Unit(s) SubCutaneous every 8 hours  insulin lispro (HumaLOG) corrective regimen sliding scale   SubCutaneous Before meals and at bedtime  lisinopril 20 milliGRAM(s) Oral two times a day  metoprolol     tartrate 25 milliGRAM(s) Oral two times a day  modafinil 200 milliGRAM(s) Oral two times a day  oxybutynin 5 milliGRAM(s) Oral two times a day  pantoprazole    Tablet 40 milliGRAM(s) Oral before breakfast  potassium chloride    Tablet ER 40 milliEquivalent(s) Oral every 4 hours  pregabalin 100 milliGRAM(s) Oral daily  pregabalin 200 milliGRAM(s) Oral at bedtime  tamsulosin 0.4 milliGRAM(s) Oral at bedtime  triamterene 37.5 mG/hydrochlorothiazide 25 mG Capsule 1 Capsule(s) Oral daily  vancomycin  IVPB 1500 milliGRAM(s) IV Intermittent every 12 hours    MEDICATIONS  (PRN):  dextrose Gel 1 Dose(s) Oral once PRN Blood Glucose LESS THAN 70 milliGRAM(s)/deciliter  glucagon  Injectable 1 milliGRAM(s) IntraMuscular once PRN Glucose LESS THAN 70 milligrams/deciliter        Vital Signs Last 24 Hrs  T(C): 36.9 (21 Dec 2017 08:41), Max: 36.9 (21 Dec 2017 08:41)  T(F): 98.4 (21 Dec 2017 08:41), Max: 98.4 (21 Dec 2017 08:41)  HR: 67 (21 Dec 2017 08:41) (64 - 73)  BP: 176/89 (21 Dec 2017 08:41) (145/69 - 219/104)  BP(mean): --  RR: 16 (21 Dec 2017 08:44) (16 - 20)  SpO2: 96% (21 Dec 2017 08:44) (94% - 98%)    PHYSICAL EXAM:  Constitutional: Obese elderly male in NAD  Eyes: SIERRA, EOMI  Ear/Nose/Throat: no oral lesion, no sinus tenderness on percussion	  Neck: no JVD, no lymphadenopathy, supple, no nuchal rigidity  Respiratory: CTA shante  Cardiovascular: S1S2 RRR, no murmurs  Gastrointestinal: Obese with scar in RUQ with prominent and reducible hernia, +bs, NTND  Extremities: RUE with port in forearm without any erythema, induration, or fluctuance; LLE with 1cm excoriation, with minimal surrounding erythema, no induration or fluctuance  Vascular: DP Pulse:	right normal; left normal      LABS:                        13.4   5.4   )-----------( 172      ( 21 Dec 2017 08:14 )             40.0     12-21    138  |  100  |  14  ----------------------------<  140<H>  3.4<L>   |  24  |  0.93    Ca    8.8      21 Dec 2017 08:14  Mg     2.1     12-21            MICROBIOLOGY:    Culture - Blood (collected 19 Dec 2017 16:27)  Source: .Blood Blood-Venous  Preliminary Report (20 Dec 2017 17:02):    No growth at 1 day.    Culture - Blood (collected 19 Dec 2017 16:27)  Source: .Blood Blood-Venous  Preliminary Report (20 Dec 2017 17:02):    No growth at 1 day.      RADIOLOGY & ADDITIONAL STUDIES:  Reviewed INTERVAL HPI/OVERNIGHT EVENTS: Patient seen and examined at bedside. Patient without complaints this morning stating he slept well overnight without fevers, chills, cough, SOB, CP, palpitations, n/v/d/c, abdominal pain, dysuria, skin rash.    CONSTITUTIONAL:  Negative fever or chills, feels well, good appetite  EYES:  Negative  blurry vision or double vision  CARDIOVASCULAR:  Negative for chest pain or palpitations  RESPIRATORY:  Negative for cough, wheezing, or SOB   GASTROINTESTINAL:  Negative for nausea, vomiting, diarrhea, constipation, or abdominal pain  GENITOURINARY:  Negative frequency, urgency or dysuria  NEUROLOGIC:  No headache, confusion, dizziness, lightheadedness      ANTIBIOTICS/RELEVANT:    MEDICATIONS  (STANDING):  acyclovir   Tablet 400 milliGRAM(s) Oral two times a day  amLODIPine   Tablet 5 milliGRAM(s) Oral daily  aspirin enteric coated 81 milliGRAM(s) Oral daily  atorvastatin 40 milliGRAM(s) Oral at bedtime  clopidogrel Tablet 75 milliGRAM(s) Oral daily  dextrose 5%. 1000 milliLiter(s) (50 mL/Hr) IV Continuous <Continuous>  dextrose 50% Injectable 12.5 Gram(s) IV Push once  dextrose 50% Injectable 25 Gram(s) IV Push once  dextrose 50% Injectable 25 Gram(s) IV Push once  DULoxetine 60 milliGRAM(s) Oral daily  finasteride 5 milliGRAM(s) Oral daily  heparin  Injectable 7500 Unit(s) SubCutaneous every 8 hours  insulin lispro (HumaLOG) corrective regimen sliding scale   SubCutaneous Before meals and at bedtime  lisinopril 20 milliGRAM(s) Oral two times a day  metoprolol     tartrate 25 milliGRAM(s) Oral two times a day  modafinil 200 milliGRAM(s) Oral two times a day  oxybutynin 5 milliGRAM(s) Oral two times a day  pantoprazole    Tablet 40 milliGRAM(s) Oral before breakfast  potassium chloride    Tablet ER 40 milliEquivalent(s) Oral every 4 hours  pregabalin 100 milliGRAM(s) Oral daily  pregabalin 200 milliGRAM(s) Oral at bedtime  tamsulosin 0.4 milliGRAM(s) Oral at bedtime  triamterene 37.5 mG/hydrochlorothiazide 25 mG Capsule 1 Capsule(s) Oral daily  vancomycin  IVPB 1500 milliGRAM(s) IV Intermittent every 12 hours    MEDICATIONS  (PRN):  dextrose Gel 1 Dose(s) Oral once PRN Blood Glucose LESS THAN 70 milliGRAM(s)/deciliter  glucagon  Injectable 1 milliGRAM(s) IntraMuscular once PRN Glucose LESS THAN 70 milligrams/deciliter        Vital Signs Last 24 Hrs  T(C): 36.9 (21 Dec 2017 08:41), Max: 36.9 (21 Dec 2017 08:41)  T(F): 98.4 (21 Dec 2017 08:41), Max: 98.4 (21 Dec 2017 08:41)  HR: 67 (21 Dec 2017 08:41) (64 - 73)  BP: 176/89 (21 Dec 2017 08:41) (145/69 - 219/104)  BP(mean): --  RR: 16 (21 Dec 2017 08:44) (16 - 20)  SpO2: 96% (21 Dec 2017 08:44) (94% - 98%)    PHYSICAL EXAM:  Constitutional: Obese elderly male in NAD  Eyes: SIERRA, EOMI  Ear/Nose/Throat: no oral lesion, no sinus tenderness on percussion	  Neck: no JVD, no lymphadenopathy, supple, no nuchal rigidity  Respiratory: CTA shante  Cardiovascular: S1S2 RRR, no murmurs  Gastrointestinal: Obese with scar in RUQ with prominent and reducible hernia, +bs, NTND  Extremities: RUE with port in forearm without any erythema, induration, or fluctuance; LLE with 1cm excoriation, with minimal surrounding erythema, no induration or fluctuance  Vascular: DP Pulse:	right normal; left normal      LABS:                        13.4   5.4   )-----------( 172      ( 21 Dec 2017 08:14 )             40.0     12-21    138  |  100  |  14  ----------------------------<  140<H>  3.4<L>   |  24  |  0.93    Ca    8.8      21 Dec 2017 08:14  Mg     2.1     12-21            MICROBIOLOGY:    Culture - Blood (collected 19 Dec 2017 16:27)  Source: .Blood Blood-Venous  Preliminary Report (20 Dec 2017 17:02):    No growth at 1 day.    Culture - Blood (collected 19 Dec 2017 16:27)  Source: .Blood Blood-Venous  Preliminary Report (20 Dec 2017 17:02):    No growth at 1 day.    Culture - Blood (12.17.17 @ 16:21)    Gram Stain:   Anaerobic Bottle: Gram Positive Cocci in Clusters  Result called to and read back bySonal Bucio RN  12/19/2017 09:05:55  ***Blood Panel PCR results on this specimen are available  approximately 3 hours after the Gram stain result.***  Gram stain,PCR, and/or culture results may not always  correspond due to difference in methodologies.  ************************************************************  This PCR assay was performed using [employer].  The following targets are tested for: Enterococcus,  vancomycin resistant enterococci, Listeria monocytogenes,  coagulase negative staphylococci, S. aureus,  methicillin resistant S. aureus, Streptococcus agalactiae  (Group B), S. pneumoniae, S. pyogenes (Group A),  Acinetobacter baumannii, Enterobacter cloacae, E. coli,  Klebsiella oxytoca, K. pneumoniae, Proteus sp.,  Serratia marcescens, Haemophilus influenzae,  Neisseria meningitidis, Pseudomonas aeruginosa, Candida  albicans, C. glabrata, C krusei, C parapsilosis,  C. tropicalis andthe KPC resistance gene.    -  Multidrug (KPC pos) resistant organism: Nondet    -  Staphylococcus aureus: Nondet    -  Methicillin resistant Staphylococcus aureus (MRSA): Nondet    -  Coagulase negative Staphylococcus: Nondet    -  Enterococcus species: Nondet    -  Vancomycin resistant Enterococcus sp.: Nondet    -  Escherichia coli: Nondet    -  Klebsiella oxytoca: Nondet    -  Klebsiella pneumoniae: Nondet    -  Serratia marcescens: Nondet    -  Proteus species: Nondet    -  Haemophilus influenzae: Nondet    -  Listeria monocytogenes: Nondet    -  Neisseria meningitidis: Nondet    -  Pseudomonas aeruginosa: Nondet    -  Acinetobacter baumanii: Nondet    -  Enterobacter cloacae complex: Nondet    -  Streptococcus sp. (Not Grp A, B or S pneumoniae): Nondet    -  Streptococcus agalactiae (Group B): Nondet    -  Streptococcus pyogenes (Group A): Nondet    -  Streptococcus pneumoniae: Nondet    -  Candida albicans: Nondet    -  Candida glabrata: Nondet    -  Candida krusei: Nondet    -  Candida parapsilosis: Nondet    -  Candida tropicalis: Nondet    Specimen Source: .Blood Blood    Organism: Blood Culture PCR    Culture Results:   Growth in anaerobic bottle: Presumptive Gemella species.  Final identification to follow.    Organism Identification: Blood Culture PCR    Method Type: PCR    RADIOLOGY & ADDITIONAL STUDIES:  Reviewed

## 2017-12-21 NOTE — PROGRESS NOTE ADULT - ASSESSMENT
72yM with PMH of MM s/p chemo (>15yo), DM (A1c6) c/b peripheral neuropathy MRSA cellulitis, TIA x2 on ASA/Plx, HTN, HLD, BPH w/ urge incontinence, Chronic BP with DRG stimulator presented from home with c/p of worsening L LE swelling, redness, pain and generalized weakness a/w cellulitis, c/b severe sepsis (since resolved) currently on IV Abx (Vanc). Hospital course c/b by mechanical fall, CTH head negative.

## 2017-12-21 NOTE — PROGRESS NOTE ADULT - PROBLEM SELECTOR PLAN 3
Pt with clinical h/o MRSA cellulitis c/b sepsis >5 times. Pt w/ DM neuropathy and multiple lesions on the LE. S/S of L LE cellulitis on exam, non-purulent. Significant clinical improvement, with no area of active cellulitis.   - c/w Vancomycin with Red man Syndrome precautions

## 2017-12-21 NOTE — PROGRESS NOTE ADULT - PROBLEM SELECTOR PLAN 6
Patient with reported HTN at home, on MTP 25mg BID, triamterene HCTZ 37.5/25mg daily. sBP 160-170s. Pt with episode of hypertension to sBP>200 o/n s/p labetolol and HCTZ PRN  - c/w MTP 25mg BID  - c/w lisinopril 20mg BID  - c/w  hctz-triamterene  - consider starting another agent as patient has been HTN since admission    # HLD   - c/w atorvastatin 40mg

## 2017-12-21 NOTE — PROGRESS NOTE ADULT - PROBLEM SELECTOR PLAN 1
Official recommendations pending discussion with Dr. 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 Given patient's history and multiple implanted devices, concern for endocarditis.  - Obtain ESTELLA  - c/w Vancomycin at 1500mg Q24H with trough prior to fourth dose - adjust as needed  - Repeat cultures NGTD, will continue to monitor  - ID will continue to follow  - Case discussed with Dr. Washington and primary team

## 2017-12-22 DIAGNOSIS — N17.9 ACUTE KIDNEY FAILURE, UNSPECIFIED: ICD-10-CM

## 2017-12-22 LAB
-  CEFTRIAXONE: SIGNIFICANT CHANGE UP
-  CLINDAMYCIN: SIGNIFICANT CHANGE UP
-  ERYTHROMYCIN: SIGNIFICANT CHANGE UP
-  LEVOFLOXACIN: SIGNIFICANT CHANGE UP
-  VANCOMYCIN: SIGNIFICANT CHANGE UP
ANION GAP SERPL CALC-SCNC: 11 MMOL/L — SIGNIFICANT CHANGE UP (ref 5–17)
APTT BLD: 39.4 SEC — HIGH (ref 27.5–37.4)
BUN SERPL-MCNC: 16 MG/DL — SIGNIFICANT CHANGE UP (ref 7–23)
CALCIUM SERPL-MCNC: 9.8 MG/DL — SIGNIFICANT CHANGE UP (ref 8.4–10.5)
CHLORIDE SERPL-SCNC: 102 MMOL/L — SIGNIFICANT CHANGE UP (ref 96–108)
CO2 SERPL-SCNC: 28 MMOL/L — SIGNIFICANT CHANGE UP (ref 22–31)
CREAT SERPL-MCNC: 1.32 MG/DL — HIGH (ref 0.5–1.3)
CULTURE RESULTS: SIGNIFICANT CHANGE UP
CULTURE RESULTS: SIGNIFICANT CHANGE UP
GLUCOSE BLDC GLUCOMTR-MCNC: 120 MG/DL — HIGH (ref 70–99)
GLUCOSE BLDC GLUCOMTR-MCNC: 120 MG/DL — HIGH (ref 70–99)
GLUCOSE BLDC GLUCOMTR-MCNC: 130 MG/DL — HIGH (ref 70–99)
GLUCOSE BLDC GLUCOMTR-MCNC: 146 MG/DL — HIGH (ref 70–99)
GLUCOSE BLDC GLUCOMTR-MCNC: 187 MG/DL — HIGH (ref 70–99)
GLUCOSE SERPL-MCNC: 144 MG/DL — HIGH (ref 70–99)
HCT VFR BLD CALC: 42 % — SIGNIFICANT CHANGE UP (ref 39–50)
HGB BLD-MCNC: 13.7 G/DL — SIGNIFICANT CHANGE UP (ref 13–17)
INR BLD: 1.05 — SIGNIFICANT CHANGE UP (ref 0.88–1.16)
MAGNESIUM SERPL-MCNC: 2.2 MG/DL — SIGNIFICANT CHANGE UP (ref 1.6–2.6)
MCHC RBC-ENTMCNC: 29.8 PG — SIGNIFICANT CHANGE UP (ref 27–34)
MCHC RBC-ENTMCNC: 32.6 G/DL — SIGNIFICANT CHANGE UP (ref 32–36)
MCV RBC AUTO: 91.5 FL — SIGNIFICANT CHANGE UP (ref 80–100)
METHOD TYPE: SIGNIFICANT CHANGE UP
METHOD TYPE: SIGNIFICANT CHANGE UP
ORGANISM # SPEC MICROSCOPIC CNT: SIGNIFICANT CHANGE UP
PLATELET # BLD AUTO: 219 K/UL — SIGNIFICANT CHANGE UP (ref 150–400)
POTASSIUM SERPL-MCNC: 4.7 MMOL/L — SIGNIFICANT CHANGE UP (ref 3.5–5.3)
POTASSIUM SERPL-SCNC: 4.7 MMOL/L — SIGNIFICANT CHANGE UP (ref 3.5–5.3)
PROTHROM AB SERPL-ACNC: 11.7 SEC — SIGNIFICANT CHANGE UP (ref 9.8–12.7)
RBC # BLD: 4.59 M/UL — SIGNIFICANT CHANGE UP (ref 4.2–5.8)
RBC # FLD: 14.4 % — SIGNIFICANT CHANGE UP (ref 10.3–16.9)
SODIUM SERPL-SCNC: 141 MMOL/L — SIGNIFICANT CHANGE UP (ref 135–145)
SPECIMEN SOURCE: SIGNIFICANT CHANGE UP
SPECIMEN SOURCE: SIGNIFICANT CHANGE UP
VANCOMYCIN TROUGH SERPL-MCNC: 20.9 UG/ML — HIGH (ref 10–20)
WBC # BLD: 6.5 K/UL — SIGNIFICANT CHANGE UP (ref 3.8–10.5)
WBC # FLD AUTO: 6.5 K/UL — SIGNIFICANT CHANGE UP (ref 3.8–10.5)

## 2017-12-22 PROCEDURE — 99233 SBSQ HOSP IP/OBS HIGH 50: CPT | Mod: GC

## 2017-12-22 PROCEDURE — 99233 SBSQ HOSP IP/OBS HIGH 50: CPT

## 2017-12-22 PROCEDURE — 93325 DOPPLER ECHO COLOR FLOW MAPG: CPT | Mod: 26

## 2017-12-22 PROCEDURE — 93312 ECHO TRANSESOPHAGEAL: CPT | Mod: 26

## 2017-12-22 PROCEDURE — 93320 DOPPLER ECHO COMPLETE: CPT | Mod: 26

## 2017-12-22 RX ORDER — CEFTRIAXONE 500 MG/1
2 INJECTION, POWDER, FOR SOLUTION INTRAMUSCULAR; INTRAVENOUS ONCE
Qty: 0 | Refills: 0 | Status: COMPLETED | OUTPATIENT
Start: 2017-12-22 | End: 2017-12-22

## 2017-12-22 RX ORDER — CEFTRIAXONE 500 MG/1
INJECTION, POWDER, FOR SOLUTION INTRAMUSCULAR; INTRAVENOUS
Qty: 0 | Refills: 0 | Status: DISCONTINUED | OUTPATIENT
Start: 2017-12-22 | End: 2017-12-23

## 2017-12-22 RX ORDER — AMLODIPINE BESYLATE 2.5 MG/1
1 TABLET ORAL
Qty: 0 | Refills: 0 | COMMUNITY
Start: 2017-12-22

## 2017-12-22 RX ORDER — CEFTRIAXONE 500 MG/1
2 INJECTION, POWDER, FOR SOLUTION INTRAMUSCULAR; INTRAVENOUS EVERY 24 HOURS
Qty: 0 | Refills: 0 | Status: DISCONTINUED | OUTPATIENT
Start: 2017-12-23 | End: 2017-12-23

## 2017-12-22 RX ADMIN — FINASTERIDE 5 MILLIGRAM(S): 5 TABLET, FILM COATED ORAL at 12:24

## 2017-12-22 RX ADMIN — CLOPIDOGREL BISULFATE 75 MILLIGRAM(S): 75 TABLET, FILM COATED ORAL at 12:24

## 2017-12-22 RX ADMIN — Medication 5 MILLIGRAM(S): at 07:13

## 2017-12-22 RX ADMIN — Medication 100 MILLIGRAM(S): at 12:24

## 2017-12-22 RX ADMIN — PANTOPRAZOLE SODIUM 40 MILLIGRAM(S): 20 TABLET, DELAYED RELEASE ORAL at 07:13

## 2017-12-22 RX ADMIN — Medication 25 MILLIGRAM(S): at 07:14

## 2017-12-22 RX ADMIN — Medication 25 MILLIGRAM(S): at 18:38

## 2017-12-22 RX ADMIN — ATORVASTATIN CALCIUM 40 MILLIGRAM(S): 80 TABLET, FILM COATED ORAL at 23:56

## 2017-12-22 RX ADMIN — MODAFINIL 200 MILLIGRAM(S): 200 TABLET ORAL at 12:24

## 2017-12-22 RX ADMIN — Medication 200 MILLIGRAM(S): at 23:56

## 2017-12-22 RX ADMIN — Medication 400 MILLIGRAM(S): at 18:38

## 2017-12-22 RX ADMIN — LISINOPRIL 20 MILLIGRAM(S): 2.5 TABLET ORAL at 18:38

## 2017-12-22 RX ADMIN — Medication 5 MILLIGRAM(S): at 18:46

## 2017-12-22 RX ADMIN — Medication 650 MILLIGRAM(S): at 00:13

## 2017-12-22 RX ADMIN — Medication 400 MILLIGRAM(S): at 07:14

## 2017-12-22 RX ADMIN — HEPARIN SODIUM 7500 UNIT(S): 5000 INJECTION INTRAVENOUS; SUBCUTANEOUS at 07:14

## 2017-12-22 RX ADMIN — Medication 1 CAPSULE(S): at 07:14

## 2017-12-22 RX ADMIN — HEPARIN SODIUM 7500 UNIT(S): 5000 INJECTION INTRAVENOUS; SUBCUTANEOUS at 14:27

## 2017-12-22 RX ADMIN — TAMSULOSIN HYDROCHLORIDE 0.4 MILLIGRAM(S): 0.4 CAPSULE ORAL at 23:57

## 2017-12-22 RX ADMIN — AMLODIPINE BESYLATE 5 MILLIGRAM(S): 2.5 TABLET ORAL at 07:16

## 2017-12-22 RX ADMIN — CEFTRIAXONE 100 GRAM(S): 500 INJECTION, POWDER, FOR SOLUTION INTRAMUSCULAR; INTRAVENOUS at 18:47

## 2017-12-22 RX ADMIN — DULOXETINE HYDROCHLORIDE 60 MILLIGRAM(S): 30 CAPSULE, DELAYED RELEASE ORAL at 12:24

## 2017-12-22 RX ADMIN — Medication 81 MILLIGRAM(S): at 12:24

## 2017-12-22 RX ADMIN — LISINOPRIL 20 MILLIGRAM(S): 2.5 TABLET ORAL at 07:14

## 2017-12-22 NOTE — PROGRESS NOTE ADULT - PROBLEM SELECTOR PLAN 6
Patient with reported HTN at home, on MTP 25mg BID, triamterene HCTZ 37.5/25mg daily. sBP 160-170s. Pt with episode of hypertension to sBP>200 o/n s/p labetolol and HCTZ PRN  - c/w MTP 25mg BID  - c/w lisinopril 20mg BID  - c/w  hctz-triamterene  - consider starting another agent as patient has been HTN since admission    # HLD   - c/w atorvastatin 40mg Patient with reported HTN at home, on MTP 25mg BID, triamterene HCTZ 37.5/25mg daily. sBP 160-170s. Pt with episode of hypertension to sBP>200 o/n s/p labetalol and HCTZ PRN  - c/w MTP 25mg BID  - c/w lisinopril 20mg BID  - c/w  hctz-triamterene  - consider starting another agent as patient has been HTN since admission    # HLD   - c/w atorvastatin 40mg

## 2017-12-22 NOTE — PROGRESS NOTE ADULT - PROBLEM SELECTOR PLAN 2
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 ceftriaxone as above, will d/c home on PO Levaquin to complete 2-week course (from 1st set of negative BCx) per ID recs; Pt. verbalized importance of seeing his oncologist in [place] to discuss possibility of removing port

## 2017-12-22 NOTE — PROGRESS NOTE ADULT - SUBJECTIVE AND OBJECTIVE BOX
Patient is a 72y old  Male who presents with a chief complaint of Cellulitis of the lower leg (18 Dec 2017 12:02)      INTERVAL HPI/OVERNIGHT EVENTS:    Pt. seen and examined at 5PM  ESTELLA negative  Pt. feels well; denies F/C    Review of Systems: 12 point review of systems otherwise negative    MEDICATIONS  (STANDING):  acyclovir   Tablet 400 milliGRAM(s) Oral two times a day  amLODIPine   Tablet 5 milliGRAM(s) Oral daily  aspirin enteric coated 81 milliGRAM(s) Oral daily  atorvastatin 40 milliGRAM(s) Oral at bedtime  cefTRIAXone   IVPB 2 Gram(s) IV Intermittent once  cefTRIAXone   IVPB      clopidogrel Tablet 75 milliGRAM(s) Oral daily  dextrose 5%. 1000 milliLiter(s) (50 mL/Hr) IV Continuous <Continuous>  dextrose 50% Injectable 12.5 Gram(s) IV Push once  dextrose 50% Injectable 25 Gram(s) IV Push once  dextrose 50% Injectable 25 Gram(s) IV Push once  DULoxetine 60 milliGRAM(s) Oral daily  finasteride 5 milliGRAM(s) Oral daily  heparin  Injectable 7500 Unit(s) SubCutaneous every 8 hours  insulin lispro (HumaLOG) corrective regimen sliding scale   SubCutaneous Before meals and at bedtime  lisinopril 20 milliGRAM(s) Oral two times a day  metoprolol     tartrate 25 milliGRAM(s) Oral two times a day  modafinil 200 milliGRAM(s) Oral daily  oxybutynin 5 milliGRAM(s) Oral two times a day  pantoprazole    Tablet 40 milliGRAM(s) Oral before breakfast  pregabalin 100 milliGRAM(s) Oral daily  pregabalin 200 milliGRAM(s) Oral at bedtime  tamsulosin 0.4 milliGRAM(s) Oral at bedtime  triamterene 37.5 mG/hydrochlorothiazide 25 mG Capsule 1 Capsule(s) Oral daily    MEDICATIONS  (PRN):  dextrose Gel 1 Dose(s) Oral once PRN Blood Glucose LESS THAN 70 milliGRAM(s)/deciliter  glucagon  Injectable 1 milliGRAM(s) IntraMuscular once PRN Glucose LESS THAN 70 milligrams/deciliter      Allergies    Bactrim (Headache)  Sular (Other)    Intolerances    Cipro (Headache)  vancomycin (Red Man Synd)        Vital Signs Last 24 Hrs  T(C): 36.5 (22 Dec 2017 17:13), Max: 37.3 (21 Dec 2017 21:09)  T(F): 97.7 (22 Dec 2017 17:13), Max: 99.2 (21 Dec 2017 21:09)  HR: 82 (22 Dec 2017 17:13) (64 - 82)  BP: 111/73 (22 Dec 2017 17:13) (111/73 - 153/83)  BP(mean): --  RR: 20 (22 Dec 2017 17:13) (17 - 20)  SpO2: 94% (22 Dec 2017 17:13) (94% - 97%)  CAPILLARY BLOOD GLUCOSE      POCT Blood Glucose.: 130 mg/dL (22 Dec 2017 17:10)  POCT Blood Glucose.: 146 mg/dL (22 Dec 2017 16:07)  POCT Blood Glucose.: 120 mg/dL (22 Dec 2017 11:57)  POCT Blood Glucose.: 120 mg/dL (22 Dec 2017 07:44)  POCT Blood Glucose.: 158 mg/dL (21 Dec 2017 21:39)      12-21 @ 07:01  -  12-22 @ 07:00  --------------------------------------------------------  IN: 200 mL / OUT: 1150 mL / NET: -950 mL    12-22 @ 07:01  -  12-22 @ 17:34  --------------------------------------------------------  IN: 0 mL / OUT: 625 mL / NET: -625 mL        Physical Exam:  (at 5PM)  Daily     Daily   General:  non-toxic and well-appearing  HEENT: MMM  Extremities:  LLE cellulitis resolving  : +[place] cath  Skin:  WWP  Neuro:  AAOx3    LABS:                        13.7   6.5   )-----------( 219      ( 22 Dec 2017 07:17 )             42.0     12-22    141  |  102  |  16  ----------------------------<  144<H>  4.7   |  28  |  1.32<H>    Ca    9.8      22 Dec 2017 07:17  Mg     2.2     12-22      PT/INR - ( 22 Dec 2017 07:17 )   PT: 11.7 sec;   INR: 1.05          PTT - ( 22 Dec 2017 07:17 )  PTT:39.4 sec        RADIOLOGY & ADDITIONAL TESTS:    ---------------------------------------------------------------------------  I personally reviewed: [  ]EKG   [  ]CXR    [  ] CT    [  ]Other  ---------------------------------------------------------------------------  PLEASE CHECK WHEN PRESENT:     [  ]Heart Failure     [  ] Acute     [  ] Acute on Chronic     [  ] Chronic  -------------------------------------------------------------------     [  ]Diastolic [HFpEF]     [  ]Systolic [HFrEF]     [  ]Combined [HFpEF & HFrEF]     [  ]Other:  -------------------------------------------------------------------  [  ]ANNE     [  ]ATN     [  ]Reneal Medullary Necrosis     [  ]Renal Cortical Necrosis     [  ]Other Pathological Lesions:    [  ]CKD 1  [  ]CKD 2  [  ]CKD 3  [  ]CKD 4  [  ]CKD 5  [  ]Other  -------------------------------------------------------------------  [  ]Other/Unspecified:    --------------------------------------------------------------------    Abdominal Nutritional Status  [  ]Malnutrition: See Nutrition Note  [  ]Cachexia  [  ]Other:   [  ]Supplement Ordered:  [  ]Morbid Obesity (BMI >=40]

## 2017-12-22 NOTE — PROGRESS NOTE ADULT - ASSESSMENT
72yM with PMH of MM s/p chemo (>13yo), DM (A1c6) c/b peripheral neuropathy MRSA cellulitis, TIA x2 on ASA/Plx, HTN, HLD, BPH w/ urge incontinence, Chronic BP with DRG stimulator presented from home with c/p of worsening L LE swelling, redness, pain and generalized weakness a/w cellulitis, c/b severe sepsis (since resolved) currently on IV Abx (Vanc). Hospital course c/b by mechanical fall, CTH head negative. 72yM with PMH of MM s/p chemo (>15yo), DM (A1c6) c/b peripheral neuropathy MRSA cellulitis, TIA x2 on ASA/Plx, HTN, HLD, BPH w/ urge incontinence, Chronic BP with DRG stimulator presented from home with c/p of worsening L LE swelling, redness, pain and generalized weakness a/w cellulitis, c/b severe sepsis (since resolved) currently on IV Abx (Vanc). Hospital course c/b by mechanical fall and bacteremia (Gemella)

## 2017-12-22 NOTE — PROGRESS NOTE ADULT - PROBLEM SELECTOR PLAN 1
Pt with one anaerobic bottle of initial set + for gram + cocci in clusters. Blood panel PCR results didn't identify organism- confirmed with laboratory likely speciation tomorrow. Of note, patient with R Central Line >7years for MM/Access (unclear reason) and DRG stimulator in back. Despite local cellulitis improvement, no leukocytosis, patient continues to be febrile. Surveillance BCx NG12h  - c/w Vancomycin; given Vanc trough 7, and CrC, can redose to 1750mg BID; ID following    - F/u speciation of original BCx - per lab, unkown organism likely S to Vanc  - per collateral Dr. Sridhar Woo of [facility] ([phone]), patient has been in remission with no identifiable reason to be immunocompromised at this time. Pt with one anaerobic bottle, of Gemella species. Pt w/ recent dental work <2mo c/b bleeding. Of note, patient with R Central Line >7years for MM/Access (unclear reason) and DRG stimulator in back. Local cellulitis improveming Surveillance BCx NGTD  - c/w Vancomycin; w/ trough before every 4th dose  - F/u speciation of original BCx - per lab, final pending  - Given species, concern for endocarditis. TTE: no veg seen but reqrd ESTELLA - plan for ESTELLA today, NPO since MN  - per collateral Dr. Sridhar Woo of [facility] ([phone]), patient has been in remission with no identifiable reason to be immunocompromised at this time.

## 2017-12-22 NOTE — PROGRESS NOTE ADULT - ATTENDING COMMENTS
Dispo: pending cx data, further work-up, ID recs  Pt. visiting from [place]
I have reviewed the medical record, including laboratory and radiographic studies, interviewed and examined the patient and discussed the plan with Dr. Blandon, the ID Resident.  Agree with above. ESTELLA is negative.  Though would have preferred two weeks of IV antibiotics from time of first negative culture, in view of logistics, can complete course with levofloxacin, which he has tolerated in the past.  His port is only being used for blood draws q2 months - should be removed.  Please recall if further ID input is desired-ID service.
I have reviewed the medical record, including laboratory and radiographic studies, interviewed and examined the patient and discussed the plan with Dr. Clark, the ID Resident.  Agree with above. Will continue to follow with you - ID service.
I have reviewed the medical record, including laboratory and radiographic studies, interviewed and examined the patient and discussed the plan with Dr. Clark, the ID Resident.  Agree with above. Will continue to follow with you - ID service.
Dispo: anticipate d/c home tomorrow on P.O. abx pending progress  Pt. visiting from [place]; [facility]
Dispo: medically-clear for d/c home  Pt. visiting from [place]
Pt. seen and examined by me earlier today; I have read Dr. Escobar's note, I agree w/ his findings and plan of care as documented; cont. abx, f/u cultures, ID recs
Patient was seen and examined by me at bedside. I agree with resident's note, subjective, objective physical exam, assessment and plan with following modifications/additions.

## 2017-12-22 NOTE — PROGRESS NOTE ADULT - PROBLEM SELECTOR PLAN 1
POA, d/t LLE cellulitis; clinically resolving; cont. IV abx (day #6, vanc changed to ceftriaxone), elevate leg, f/u cultures; lactic acidosis resolved; cellulitis is recurrent, h/o MRSA SSTIs, has outpatient Podiatry f/u for preventative care

## 2017-12-22 NOTE — PROGRESS NOTE ADULT - SUBJECTIVE AND OBJECTIVE BOX
OVERNIGHT EVENTS: SHARON  NPO since midnight for ESTELLA    SUBJECTIVE / INTERVAL HPI: Patient seen and examined at bedside.   PT w/o complaints overnight, slept with his home CPAP machine. Addressed questions/concerns regarding planned ESTELLA today. Patient w/o fevers, chills, myalgias, NVD, Abd pain. Last BM yesterday, formed, normal color (-melena).     VITAL SIGNS:  Vital Signs Last 24 Hrs  T(C): 36.3 (22 Dec 2017 05:09), Max: 37.3 (21 Dec 2017 21:09)  T(F): 97.4 (22 Dec 2017 05:09), Max: 99.2 (21 Dec 2017 21:09)  HR: 80 (22 Dec 2017 05:09) (67 - 81)  BP: 153/83 (22 Dec 2017 05:09) (130/83 - 176/89)  BP(mean): --  RR: 18 (22 Dec 2017 06:11) (16 - 20)  SpO2: 95% (22 Dec 2017 05:09) (95% - 97%)    PHYSICAL EXAM:    General: NAD, lying in bed comfortably.   HEENT: NC/AT; PERRL, anicteric sclera; MMM  Neck: supple  Cardiovascular: +S1/S2; RRR  Respiratory: CTA B/L; no W/R/R, good inspiratory effort.   Gastrointestinal: protuberant soft, NT/ND; +BSx4  Extremities: WWP; no edema, clubbing or cyanosis, multiple superficial abrasions of different ages bilaterally L Lower Ext: 3cm well healed, scabbed abrasion without surrounding erythema. non-tender to palpation, no area of induration or discharge present.   Vascular: 2+ radial, DP/PT pulses B/L  Neurological: AAOx3; no focal deficits    MEDICATIONS:  MEDICATIONS  (STANDING):  acyclovir   Tablet 400 milliGRAM(s) Oral two times a day  amLODIPine   Tablet 5 milliGRAM(s) Oral daily  aspirin enteric coated 81 milliGRAM(s) Oral daily  atorvastatin 40 milliGRAM(s) Oral at bedtime  clopidogrel Tablet 75 milliGRAM(s) Oral daily  dextrose 5%. 1000 milliLiter(s) (50 mL/Hr) IV Continuous <Continuous>  dextrose 50% Injectable 12.5 Gram(s) IV Push once  dextrose 50% Injectable 25 Gram(s) IV Push once  dextrose 50% Injectable 25 Gram(s) IV Push once  DULoxetine 60 milliGRAM(s) Oral daily  finasteride 5 milliGRAM(s) Oral daily  heparin  Injectable 7500 Unit(s) SubCutaneous every 8 hours  insulin lispro (HumaLOG) corrective regimen sliding scale   SubCutaneous Before meals and at bedtime  lisinopril 20 milliGRAM(s) Oral two times a day  metoprolol     tartrate 25 milliGRAM(s) Oral two times a day  modafinil 200 milliGRAM(s) Oral daily  oxybutynin 5 milliGRAM(s) Oral two times a day  pantoprazole    Tablet 40 milliGRAM(s) Oral before breakfast  pregabalin 100 milliGRAM(s) Oral daily  pregabalin 200 milliGRAM(s) Oral at bedtime  tamsulosin 0.4 milliGRAM(s) Oral at bedtime  triamterene 37.5 mG/hydrochlorothiazide 25 mG Capsule 1 Capsule(s) Oral daily  vancomycin  IVPB 1500 milliGRAM(s) IV Intermittent every 12 hours    MEDICATIONS  (PRN):  dextrose Gel 1 Dose(s) Oral once PRN Blood Glucose LESS THAN 70 milliGRAM(s)/deciliter  glucagon  Injectable 1 milliGRAM(s) IntraMuscular once PRN Glucose LESS THAN 70 milligrams/deciliter      ALLERGIES:  Allergies    Bactrim (Headache)  Sular (Other)    Intolerances    Cipro (Headache)  vancomycin (Red Man Synd)      LABS:                        13.7   6.5   )-----------( 219      ( 22 Dec 2017 07:17 )             42.0     12-22    141  |  102  |  16  ----------------------------<  144<H>  4.7   |  28  |  1.32<H>    Ca    9.8      22 Dec 2017 07:17  Mg     2.2     12-22      PT/INR - ( 22 Dec 2017 07:17 )   PT: 11.7 sec;   INR: 1.05          PTT - ( 22 Dec 2017 07:17 )  PTT:39.4 sec    CAPILLARY BLOOD GLUCOSE      POCT Blood Glucose.: 120 mg/dL (22 Dec 2017 07:44)      RADIOLOGY & ADDITIONAL TESTS: Reviewed.    ASSESSMENT:    PLAN: OVERNIGHT EVENTS: SHARON  NPO since midnight for ESTELLA    SUBJECTIVE / INTERVAL HPI: Patient seen and examined at bedside.   PT w/o complaints overnight, slept with his home CPAP machine. Addressed questions/concerns regarding planned ESTELLA today. Patient w/o fevers, chills, myalgias, NVD, Abd pain. Last BM yesterday, formed, normal color (-melena).     VITAL SIGNS:  Vital Signs Last 24 Hrs  T(C): 36.3 (22 Dec 2017 05:09), Max: 37.3 (21 Dec 2017 21:09)  T(F): 97.4 (22 Dec 2017 05:09), Max: 99.2 (21 Dec 2017 21:09)  HR: 80 (22 Dec 2017 05:09) (67 - 81)  BP: 153/83 (22 Dec 2017 05:09) (130/83 - 176/89)  BP(mean): --  RR: 18 (22 Dec 2017 06:11) (16 - 20)  SpO2: 95% (22 Dec 2017 05:09) (95% - 97%)    PHYSICAL EXAM:    General: NAD, lying in bed comfortably.   HEENT: NC/AT; PERRL, anicteric sclera; MMM  Neck: supple  Cardiovascular: +S1/S2; RRR  Respiratory: CTA B/L; no W/R/R, good inspiratory effort.   Gastrointestinal: protuberant soft, NT/ND; +BSx4  Extremities: WWP; no edema, clubbing or cyanosis, multiple superficial abrasions of different ages bilaterally L Lower Ext: 3cm well healed, scabbed abrasion without surrounding erythema. non-tender to palpation, no area of induration or discharge present.   Vascular: 2+ radial, DP/PT pulses B/L  Neurological: AAOx3; no focal deficits    MEDICATIONS:  MEDICATIONS  (STANDING):  acyclovir   Tablet 400 milliGRAM(s) Oral two times a day  amLODIPine   Tablet 5 milliGRAM(s) Oral daily  aspirin enteric coated 81 milliGRAM(s) Oral daily  atorvastatin 40 milliGRAM(s) Oral at bedtime  clopidogrel Tablet 75 milliGRAM(s) Oral daily  dextrose 5%. 1000 milliLiter(s) (50 mL/Hr) IV Continuous <Continuous>  dextrose 50% Injectable 12.5 Gram(s) IV Push once  dextrose 50% Injectable 25 Gram(s) IV Push once  dextrose 50% Injectable 25 Gram(s) IV Push once  DULoxetine 60 milliGRAM(s) Oral daily  finasteride 5 milliGRAM(s) Oral daily  heparin  Injectable 7500 Unit(s) SubCutaneous every 8 hours  insulin lispro (HumaLOG) corrective regimen sliding scale   SubCutaneous Before meals and at bedtime  lisinopril 20 milliGRAM(s) Oral two times a day  metoprolol     tartrate 25 milliGRAM(s) Oral two times a day  modafinil 200 milliGRAM(s) Oral daily  oxybutynin 5 milliGRAM(s) Oral two times a day  pantoprazole    Tablet 40 milliGRAM(s) Oral before breakfast  pregabalin 100 milliGRAM(s) Oral daily  pregabalin 200 milliGRAM(s) Oral at bedtime  tamsulosin 0.4 milliGRAM(s) Oral at bedtime  triamterene 37.5 mG/hydrochlorothiazide 25 mG Capsule 1 Capsule(s) Oral daily  vancomycin  IVPB 1500 milliGRAM(s) IV Intermittent every 12 hours    MEDICATIONS  (PRN):  dextrose Gel 1 Dose(s) Oral once PRN Blood Glucose LESS THAN 70 milliGRAM(s)/deciliter  glucagon  Injectable 1 milliGRAM(s) IntraMuscular once PRN Glucose LESS THAN 70 milligrams/deciliter      ALLERGIES:  Allergies    Bactrim (Headache)  Sular (Other)    Intolerances    Cipro (Headache)  vancomycin (Red Man Synd)      LABS:                        13.7   6.5   )-----------( 219      ( 22 Dec 2017 07:17 )             42.0     12-22    141  |  102  |  16  ----------------------------<  144<H>  4.7   |  28  |  1.32<H>    Ca    9.8      22 Dec 2017 07:17  Mg     2.2     12-22      PT/INR - ( 22 Dec 2017 07:17 )   PT: 11.7 sec;   INR: 1.05          PTT - ( 22 Dec 2017 07:17 )  PTT:39.4 sec    CAPILLARY BLOOD GLUCOSE      POCT Blood Glucose.: 120 mg/dL (22 Dec 2017 07:44)      RADIOLOGY & ADDITIONAL TESTS: Reviewed.

## 2017-12-22 NOTE — PROGRESS NOTE ADULT - SUBJECTIVE AND OBJECTIVE BOX
ID FOLLOW UP NOTE  Pt was seen and examined at the bedside. He was observed to be lying down comfortably.   Pt c/o wanting to change rooms, otherwise no medical complaints.    VITAL SIGNS:  T(F): 98.7 (12-22-17 @ 08:46)  HR: 64 (12-22-17 @ 08:46)  BP: 128/687 (12-22-17 @ 08:46)  RR: 18 (12-22-17 @ 08:46)  SpO2: 94% (12-22-17 @ 08:46)  Wt(kg): --  I&O's Summary    21 Dec 2017 07:01  -  22 Dec 2017 07:00  --------------------------------------------------------  IN: 200 mL / OUT: 1150 mL / NET: -950 mL    22 Dec 2017 07:01  -  22 Dec 2017 14:19  --------------------------------------------------------  IN: 0 mL / OUT: 625 mL / NET: -625 mL      PHYSICAL EXAM:  Constitutional: Obese elderly male in NAD  Eyes: SIERRA, EOMI  Ear/Nose/Throat: no oral lesion, no sinus tenderness on percussion	  Neck: no JVD, no lymphadenopathy, supple, no nuchal rigidity  Respiratory: CTA shante  Cardiovascular: S1S2 RRR, no murmurs  Gastrointestinal: Obese with scar in RUQ with prominent and reducible hernia, +bs, NTND  Extremities: RUE with port in forearm without any erythema, induration, or fluctuance; LLE with 1cm excoriation, with minimal surrounding erythema, no induration or fluctuance  Vascular: DP Pulse:	right normal; left normal        MEDICATIONS  (STANDING):  acyclovir   Tablet 400 milliGRAM(s) Oral two times a day  amLODIPine   Tablet 5 milliGRAM(s) Oral daily  aspirin enteric coated 81 milliGRAM(s) Oral daily  atorvastatin 40 milliGRAM(s) Oral at bedtime  clopidogrel Tablet 75 milliGRAM(s) Oral daily  dextrose 5%. 1000 milliLiter(s) (50 mL/Hr) IV Continuous <Continuous>  dextrose 50% Injectable 12.5 Gram(s) IV Push once  dextrose 50% Injectable 25 Gram(s) IV Push once  dextrose 50% Injectable 25 Gram(s) IV Push once  DULoxetine 60 milliGRAM(s) Oral daily  finasteride 5 milliGRAM(s) Oral daily  heparin  Injectable 7500 Unit(s) SubCutaneous every 8 hours  insulin lispro (HumaLOG) corrective regimen sliding scale   SubCutaneous Before meals and at bedtime  lisinopril 20 milliGRAM(s) Oral two times a day  metoprolol     tartrate 25 milliGRAM(s) Oral two times a day  modafinil 200 milliGRAM(s) Oral daily  oxybutynin 5 milliGRAM(s) Oral two times a day  pantoprazole    Tablet 40 milliGRAM(s) Oral before breakfast  pregabalin 100 milliGRAM(s) Oral daily  pregabalin 200 milliGRAM(s) Oral at bedtime  tamsulosin 0.4 milliGRAM(s) Oral at bedtime  triamterene 37.5 mG/hydrochlorothiazide 25 mG Capsule 1 Capsule(s) Oral daily    MEDICATIONS  (PRN):  dextrose Gel 1 Dose(s) Oral once PRN Blood Glucose LESS THAN 70 milliGRAM(s)/deciliter  glucagon  Injectable 1 milliGRAM(s) IntraMuscular once PRN Glucose LESS THAN 70 milligrams/deciliter      Allergies    Bactrim (Headache)  Sular (Other)    Intolerances    Cipro (Headache)  vancomycin ([facility])      LABS:                        13.7   6.5   )-----------( 219      ( 22 Dec 2017 07:17 )             42.0     12-22    141  |  102  |  16  ----------------------------<  144<H>  4.7   |  28  |  1.32<H>    Ca    9.8      22 Dec 2017 07:17  Mg     2.2     12-22      PT/INR - ( 22 Dec 2017 07:17 )   PT: 11.7 sec;   INR: 1.05          PTT - ( 22 Dec 2017 07:17 )  PTT:39.4 sec      Culture - Blood (collected 19 Dec 2017 16:27)  Source: .Blood Blood-Venous  Preliminary Report (21 Dec 2017 17:02):    No growth at 2 days.    Culture - Blood (collected 19 Dec 2017 16:27)  Source: .Blood Blood-Venous  Preliminary Report (21 Dec 2017 17:02):    No growth at 2 days.    Culture - Blood (12.17.17 @ 16:21)    Gram Stain:   Anaerobic Bottle: Gram Positive Cocci in Clusters  Result called to and read back bySonal Bucio RN  12/19/2017 09:05:55  ***Blood Panel PCR results on this specimen are available  approximately 3 hours after the Gram stain result.***  Gram stain,PCR, and/or culture results may not always  correspond due to difference in methodologies.  ************************************************************  This PCR assay was performed using [employer].  The following targets are tested for: Enterococcus,  vancomycin resistant enterococci, Listeria monocytogenes,  coagulase negative staphylococci, S. aureus,  methicillin resistant S. aureus, Streptococcus agalactiae  (Group B), S. pneumoniae, S. pyogenes (Group A),  Acinetobacter baumannii, Enterobacter cloacae, E. coli,  Klebsiella oxytoca, K. pneumoniae, Proteus sp.,  Serratia marcescens, Haemophilus influenzae,  Neisseria meningitidis, Pseudomonas aeruginosa, Candida  albicans, C. glabrata, C krusei, C parapsilosis,  C. tropicalis andthe KPC resistance gene.    -  Ceftriaxone: S 0.125    -  Clindamycin: S    -  Erythromycin: S    -  Levofloxacin: S 0.002    -  Vancomycin: S    -  Multidrug (KPC pos) resistant organism: Nondet    -  Staphylococcus aureus: Nondet    -  Methicillin resistant Staphylococcus aureus (MRSA): Nondet    -  Coagulase negative Staphylococcus: Nondet    -  Enterococcus species: Nondet    -  Vancomycin resistant Enterococcus sp.: Nondet    -  Escherichia coli: Nondet    -  Klebsiella oxytoca: Nondet    -  Klebsiella pneumoniae: Nondet    -  Serratia marcescens: Nondet    -  Proteus species: Nondet    -  Haemophilus influenzae: Nondet    -  Listeria monocytogenes: Nondet    -  Neisseria meningitidis: Nondet    -  Pseudomonas aeruginosa: Nondet    -  Acinetobacter baumanii: Nondet    -  Enterobacter cloacae complex: Nondet    -  Streptococcus sp. (Not Grp A, B or S pneumoniae): Nondet    -  Streptococcus agalactiae (Group B): Nondet    -  Streptococcus pyogenes (Group A): Nondet    -  Streptococcus pneumoniae: Nondet    -  Candida albicans: Nondet    -  Candida glabrata: Nondet    -  Candida krusei: Nondet    -  Candida parapsilosis: Nondet    -  Candida tropicalis: Nondet    Specimen Source: .Blood Blood    Organism: Gemella (Streptococcus) morbillorum    Organism: Gemella (Streptococcus) morbillorum    Organism: Blood Culture PCR    Culture Results:   Growth in anaerobic bottle: Gemella morbillorum  Aerobic Bottle: No growth    Organism Identification: Gemella (Streptococcus) morbillorum  Gemella (Streptococcus) morbillorum  Blood Culture PCR    Method Type: PCR    Method Type: ETEST    Method Type: KB      73yo M with a PMHx of MM s/p chemo, DM with peripheral neuropathy and recurrent episodes of MRSA cellulitis 2/2 poor wound healing, TIA x2, HTN, HLD, BPH, chronic back pain with DRG stimulator who presented complaining of worsening LLE redness and pain with generalized weakness and fever, found to be bacteremic with what is now reported as Gemella species      Bacteremia with Gemella species. This micro-organism is notorious to cause IE. Also given patient's history and multiple implanted devices, concern for endocarditis high. Vancomycin dc'ed due to supra-therapeutic trough.  - awaiting ESTELLA today  - please discontinue Vanco and start Ceftriaxone 2 grams Q24hrs, if ESTELLA positive for IE will need ceftriaxone 2grams q12hrs for a total of 6 weeks, if not will need 2 weeks of Ceftriaxone QD since last negative Cx  -Will follow      Discussed with primary team. ID FOLLOW UP NOTE  Pt was seen and examined at the bedside. He was observed to be lying down comfortably.   Pt c/o wanting to change rooms, otherwise no medical complaints.    VITAL SIGNS:  T(F): 98.7 (12-22-17 @ 08:46)  HR: 64 (12-22-17 @ 08:46)  BP: 128/687 (12-22-17 @ 08:46)  RR: 18 (12-22-17 @ 08:46)  SpO2: 94% (12-22-17 @ 08:46)  Wt(kg): --  I&O's Summary    21 Dec 2017 07:01  -  22 Dec 2017 07:00  --------------------------------------------------------  IN: 200 mL / OUT: 1150 mL / NET: -950 mL    22 Dec 2017 07:01  -  22 Dec 2017 14:19  --------------------------------------------------------  IN: 0 mL / OUT: 625 mL / NET: -625 mL      PHYSICAL EXAM:  Constitutional: Obese elderly male in NAD  Eyes: SIERRA, EOMI  Ear/Nose/Throat: no oral lesion, no sinus tenderness on percussion	  Neck: no JVD, no lymphadenopathy, supple, no nuchal rigidity  Respiratory: CTA shante  Cardiovascular: S1S2 RRR, no murmurs  Gastrointestinal: Obese with scar in RUQ with prominent and reducible hernia, +bs, NTND  Extremities: RUE with port in forearm without any erythema, induration, or fluctuance; LLE with 1cm excoriation, with minimal surrounding erythema, no induration or fluctuance  Vascular: DP Pulse:	right normal; left normal        MEDICATIONS  (STANDING):  acyclovir   Tablet 400 milliGRAM(s) Oral two times a day  amLODIPine   Tablet 5 milliGRAM(s) Oral daily  aspirin enteric coated 81 milliGRAM(s) Oral daily  atorvastatin 40 milliGRAM(s) Oral at bedtime  clopidogrel Tablet 75 milliGRAM(s) Oral daily  dextrose 5%. 1000 milliLiter(s) (50 mL/Hr) IV Continuous <Continuous>  dextrose 50% Injectable 12.5 Gram(s) IV Push once  dextrose 50% Injectable 25 Gram(s) IV Push once  dextrose 50% Injectable 25 Gram(s) IV Push once  DULoxetine 60 milliGRAM(s) Oral daily  finasteride 5 milliGRAM(s) Oral daily  heparin  Injectable 7500 Unit(s) SubCutaneous every 8 hours  insulin lispro (HumaLOG) corrective regimen sliding scale   SubCutaneous Before meals and at bedtime  lisinopril 20 milliGRAM(s) Oral two times a day  metoprolol     tartrate 25 milliGRAM(s) Oral two times a day  modafinil 200 milliGRAM(s) Oral daily  oxybutynin 5 milliGRAM(s) Oral two times a day  pantoprazole    Tablet 40 milliGRAM(s) Oral before breakfast  pregabalin 100 milliGRAM(s) Oral daily  pregabalin 200 milliGRAM(s) Oral at bedtime  tamsulosin 0.4 milliGRAM(s) Oral at bedtime  triamterene 37.5 mG/hydrochlorothiazide 25 mG Capsule 1 Capsule(s) Oral daily    MEDICATIONS  (PRN):  dextrose Gel 1 Dose(s) Oral once PRN Blood Glucose LESS THAN 70 milliGRAM(s)/deciliter  glucagon  Injectable 1 milliGRAM(s) IntraMuscular once PRN Glucose LESS THAN 70 milligrams/deciliter      Allergies    Bactrim (Headache)  Sular (Other)    Intolerances    Cipro (Headache)  vancomycin ([facility])      LABS:                        13.7   6.5   )-----------( 219      ( 22 Dec 2017 07:17 )             42.0     12-22    141  |  102  |  16  ----------------------------<  144<H>  4.7   |  28  |  1.32<H>    Ca    9.8      22 Dec 2017 07:17  Mg     2.2     12-22      PT/INR - ( 22 Dec 2017 07:17 )   PT: 11.7 sec;   INR: 1.05          PTT - ( 22 Dec 2017 07:17 )  PTT:39.4 sec      Culture - Blood (collected 19 Dec 2017 16:27)  Source: .Blood Blood-Venous  Preliminary Report (21 Dec 2017 17:02):    No growth at 2 days.    Culture - Blood (collected 19 Dec 2017 16:27)  Source: .Blood Blood-Venous  Preliminary Report (21 Dec 2017 17:02):    No growth at 2 days.    Culture - Blood (12.17.17 @ 16:21)    Gram Stain:   Anaerobic Bottle: Gram Positive Cocci in Clusters  Result called to and read back bySonal Bucio RN  12/19/2017 09:05:55  ***Blood Panel PCR results on this specimen are available  approximately 3 hours after the Gram stain result.***  Gram stain,PCR, and/or culture results may not always  correspond due to difference in methodologies.  ************************************************************  This PCR assay was performed using [employer].  The following targets are tested for: Enterococcus,  vancomycin resistant enterococci, Listeria monocytogenes,  coagulase negative staphylococci, S. aureus,  methicillin resistant S. aureus, Streptococcus agalactiae  (Group B), S. pneumoniae, S. pyogenes (Group A),  Acinetobacter baumannii, Enterobacter cloacae, E. coli,  Klebsiella oxytoca, K. pneumoniae, Proteus sp.,  Serratia marcescens, Haemophilus influenzae,  Neisseria meningitidis, Pseudomonas aeruginosa, Candida  albicans, C. glabrata, C krusei, C parapsilosis,  C. tropicalis andthe KPC resistance gene.    -  Ceftriaxone: S 0.125    -  Clindamycin: S    -  Erythromycin: S    -  Levofloxacin: S 0.002    -  Vancomycin: S    -  Multidrug (KPC pos) resistant organism: Nondet    -  Staphylococcus aureus: Nondet    -  Methicillin resistant Staphylococcus aureus (MRSA): Nondet    -  Coagulase negative Staphylococcus: Nondet    -  Enterococcus species: Nondet    -  Vancomycin resistant Enterococcus sp.: Nondet    -  Escherichia coli: Nondet    -  Klebsiella oxytoca: Nondet    -  Klebsiella pneumoniae: Nondet    -  Serratia marcescens: Nondet    -  Proteus species: Nondet    -  Haemophilus influenzae: Nondet    -  Listeria monocytogenes: Nondet    -  Neisseria meningitidis: Nondet    -  Pseudomonas aeruginosa: Nondet    -  Acinetobacter baumanii: Nondet    -  Enterobacter cloacae complex: Nondet    -  Streptococcus sp. (Not Grp A, B or S pneumoniae): Nondet    -  Streptococcus agalactiae (Group B): Nondet    -  Streptococcus pyogenes (Group A): Nondet    -  Streptococcus pneumoniae: Nondet    -  Candida albicans: Nondet    -  Candida glabrata: Nondet    -  Candida krusei: Nondet    -  Candida parapsilosis: Nondet    -  Candida tropicalis: Nondet    Specimen Source: .Blood Blood    Organism: Gemella (Streptococcus) morbillorum    Organism: Gemella (Streptococcus) morbillorum    Organism: Blood Culture PCR    Culture Results:   Growth in anaerobic bottle: Gemella morbillorum  Aerobic Bottle: No growth    Organism Identification: Gemella (Streptococcus) morbillorum  Gemella (Streptococcus) morbillorum  Blood Culture PCR    Method Type: PCR    Method Type: ETEST    Method Type: KB      71yo M with a PMHx of MM s/p chemo, DM with peripheral neuropathy and recurrent episodes of MRSA cellulitis 2/2 poor wound healing, TIA x2, HTN, HLD, BPH, chronic back pain with DRG stimulator who presented complaining of worsening LLE redness and pain with generalized weakness and fever, found to be bacteremic with what is now reported as Gemella species      Bacteremia with Gemella species. This micro-organism is notorious to cause IE. Also given patient's history and multiple implanted devices, concern for endocarditis high. Vancomycin dc'ed due to supra-therapeutic trough.  - awaiting ESTELLA today  - please discontinue Vanco and start Ceftriaxone 2 grams Q24hrs, if ESTELLA positive for IE will need ceftriaxone 2grams q12hrs for a total of 6 weeks, if not will need 2 weeks of Ceftriaxone QD since last negative Cx. ID preference would be Ceftriaxone even if ESTELLA negative, however taking into account the fact that he needs to travel back to [place], and issues getting PICC line placed over the weekend, if primary team prefers can switch to Levaquin and continue for 2 weeks after last negative culture, last abx day would be 1/3/2018. He would likely need the port removed in the future.  -Will follow      Discussed with primary team.

## 2017-12-23 VITALS
DIASTOLIC BLOOD PRESSURE: 84 MMHG | RESPIRATION RATE: 19 BRPM | OXYGEN SATURATION: 94 % | HEART RATE: 90 BPM | TEMPERATURE: 98 F | SYSTOLIC BLOOD PRESSURE: 142 MMHG

## 2017-12-23 LAB
ANION GAP SERPL CALC-SCNC: 14 MMOL/L — SIGNIFICANT CHANGE UP (ref 5–17)
BUN SERPL-MCNC: 20 MG/DL — SIGNIFICANT CHANGE UP (ref 7–23)
CALCIUM SERPL-MCNC: 9.7 MG/DL — SIGNIFICANT CHANGE UP (ref 8.4–10.5)
CHLORIDE SERPL-SCNC: 100 MMOL/L — SIGNIFICANT CHANGE UP (ref 96–108)
CO2 SERPL-SCNC: 24 MMOL/L — SIGNIFICANT CHANGE UP (ref 22–31)
CREAT ?TM UR-MCNC: 121 MG/DL — SIGNIFICANT CHANGE UP
CREAT SERPL-MCNC: 1.18 MG/DL — SIGNIFICANT CHANGE UP (ref 0.5–1.3)
GLUCOSE BLDC GLUCOMTR-MCNC: 116 MG/DL — HIGH (ref 70–99)
GLUCOSE BLDC GLUCOMTR-MCNC: 206 MG/DL — HIGH (ref 70–99)
GLUCOSE SERPL-MCNC: 123 MG/DL — HIGH (ref 70–99)
HCT VFR BLD CALC: 41.1 % — SIGNIFICANT CHANGE UP (ref 39–50)
HGB BLD-MCNC: 13.9 G/DL — SIGNIFICANT CHANGE UP (ref 13–17)
MAGNESIUM SERPL-MCNC: 2 MG/DL — SIGNIFICANT CHANGE UP (ref 1.6–2.6)
MCHC RBC-ENTMCNC: 30.7 PG — SIGNIFICANT CHANGE UP (ref 27–34)
MCHC RBC-ENTMCNC: 33.8 G/DL — SIGNIFICANT CHANGE UP (ref 32–36)
MCV RBC AUTO: 90.7 FL — SIGNIFICANT CHANGE UP (ref 80–100)
PLATELET # BLD AUTO: 202 K/UL — SIGNIFICANT CHANGE UP (ref 150–400)
POTASSIUM SERPL-MCNC: 4.3 MMOL/L — SIGNIFICANT CHANGE UP (ref 3.5–5.3)
POTASSIUM SERPL-SCNC: 4.3 MMOL/L — SIGNIFICANT CHANGE UP (ref 3.5–5.3)
RBC # BLD: 4.53 M/UL — SIGNIFICANT CHANGE UP (ref 4.2–5.8)
RBC # FLD: 14.4 % — SIGNIFICANT CHANGE UP (ref 10.3–16.9)
SODIUM SERPL-SCNC: 138 MMOL/L — SIGNIFICANT CHANGE UP (ref 135–145)
SODIUM UR-SCNC: 140 MMOL/L — SIGNIFICANT CHANGE UP
WBC # BLD: 8.5 K/UL — SIGNIFICANT CHANGE UP (ref 3.8–10.5)
WBC # FLD AUTO: 8.5 K/UL — SIGNIFICANT CHANGE UP (ref 3.8–10.5)

## 2017-12-23 PROCEDURE — 87798 DETECT AGENT NOS DNA AMP: CPT

## 2017-12-23 PROCEDURE — 93312 ECHO TRANSESOPHAGEAL: CPT

## 2017-12-23 PROCEDURE — 86850 RBC ANTIBODY SCREEN: CPT

## 2017-12-23 PROCEDURE — 85610 PROTHROMBIN TIME: CPT

## 2017-12-23 PROCEDURE — 83605 ASSAY OF LACTIC ACID: CPT

## 2017-12-23 PROCEDURE — 86901 BLOOD TYPING SEROLOGIC RH(D): CPT

## 2017-12-23 PROCEDURE — 83036 HEMOGLOBIN GLYCOSYLATED A1C: CPT

## 2017-12-23 PROCEDURE — 82570 ASSAY OF URINE CREATININE: CPT

## 2017-12-23 PROCEDURE — 87581 M.PNEUMON DNA AMP PROBE: CPT

## 2017-12-23 PROCEDURE — 71045 X-RAY EXAM CHEST 1 VIEW: CPT

## 2017-12-23 PROCEDURE — 93005 ELECTROCARDIOGRAM TRACING: CPT

## 2017-12-23 PROCEDURE — 87086 URINE CULTURE/COLONY COUNT: CPT

## 2017-12-23 PROCEDURE — 85027 COMPLETE CBC AUTOMATED: CPT

## 2017-12-23 PROCEDURE — 87633 RESP VIRUS 12-25 TARGETS: CPT

## 2017-12-23 PROCEDURE — 82962 GLUCOSE BLOOD TEST: CPT

## 2017-12-23 PROCEDURE — 80053 COMPREHEN METABOLIC PANEL: CPT

## 2017-12-23 PROCEDURE — 94660 CPAP INITIATION&MGMT: CPT

## 2017-12-23 PROCEDURE — 87150 DNA/RNA AMPLIFIED PROBE: CPT

## 2017-12-23 PROCEDURE — 87184 SC STD DISK METHOD PER PLATE: CPT

## 2017-12-23 PROCEDURE — 81001 URINALYSIS AUTO W/SCOPE: CPT

## 2017-12-23 PROCEDURE — 84300 ASSAY OF URINE SODIUM: CPT

## 2017-12-23 PROCEDURE — 85025 COMPLETE CBC W/AUTO DIFF WBC: CPT

## 2017-12-23 PROCEDURE — 96374 THER/PROPH/DIAG INJ IV PUSH: CPT

## 2017-12-23 PROCEDURE — 85730 THROMBOPLASTIN TIME PARTIAL: CPT

## 2017-12-23 PROCEDURE — 93306 TTE W/DOPPLER COMPLETE: CPT

## 2017-12-23 PROCEDURE — 99285 EMERGENCY DEPT VISIT HI MDM: CPT | Mod: 25

## 2017-12-23 PROCEDURE — 99239 HOSP IP/OBS DSCHRG MGMT >30: CPT

## 2017-12-23 PROCEDURE — 87486 CHLMYD PNEUM DNA AMP PROBE: CPT

## 2017-12-23 PROCEDURE — 86900 BLOOD TYPING SEROLOGIC ABO: CPT

## 2017-12-23 PROCEDURE — 87040 BLOOD CULTURE FOR BACTERIA: CPT

## 2017-12-23 PROCEDURE — 80048 BASIC METABOLIC PNL TOTAL CA: CPT

## 2017-12-23 PROCEDURE — 70450 CT HEAD/BRAIN W/O DYE: CPT

## 2017-12-23 PROCEDURE — 83735 ASSAY OF MAGNESIUM: CPT

## 2017-12-23 PROCEDURE — 36415 COLL VENOUS BLD VENIPUNCTURE: CPT

## 2017-12-23 PROCEDURE — 80202 ASSAY OF VANCOMYCIN: CPT

## 2017-12-23 RX ORDER — CEFTRIAXONE 500 MG/1
2 INJECTION, POWDER, FOR SOLUTION INTRAMUSCULAR; INTRAVENOUS ONCE
Qty: 0 | Refills: 0 | Status: COMPLETED | OUTPATIENT
Start: 2017-12-23 | End: 2017-12-23

## 2017-12-23 RX ADMIN — FINASTERIDE 5 MILLIGRAM(S): 5 TABLET, FILM COATED ORAL at 11:42

## 2017-12-23 RX ADMIN — Medication 2: at 13:09

## 2017-12-23 RX ADMIN — Medication 25 MILLIGRAM(S): at 07:23

## 2017-12-23 RX ADMIN — Medication 1 CAPSULE(S): at 07:24

## 2017-12-23 RX ADMIN — CLOPIDOGREL BISULFATE 75 MILLIGRAM(S): 75 TABLET, FILM COATED ORAL at 11:43

## 2017-12-23 RX ADMIN — DULOXETINE HYDROCHLORIDE 60 MILLIGRAM(S): 30 CAPSULE, DELAYED RELEASE ORAL at 11:43

## 2017-12-23 RX ADMIN — HEPARIN SODIUM 7500 UNIT(S): 5000 INJECTION INTRAVENOUS; SUBCUTANEOUS at 07:23

## 2017-12-23 RX ADMIN — LISINOPRIL 20 MILLIGRAM(S): 2.5 TABLET ORAL at 07:23

## 2017-12-23 RX ADMIN — Medication 100 MILLIGRAM(S): at 11:43

## 2017-12-23 RX ADMIN — Medication 400 MILLIGRAM(S): at 07:22

## 2017-12-23 RX ADMIN — PANTOPRAZOLE SODIUM 40 MILLIGRAM(S): 20 TABLET, DELAYED RELEASE ORAL at 07:27

## 2017-12-23 RX ADMIN — HEPARIN SODIUM 7500 UNIT(S): 5000 INJECTION INTRAVENOUS; SUBCUTANEOUS at 00:01

## 2017-12-23 RX ADMIN — Medication 81 MILLIGRAM(S): at 11:43

## 2017-12-23 RX ADMIN — Medication 2: at 00:00

## 2017-12-23 RX ADMIN — Medication 5 MILLIGRAM(S): at 07:27

## 2017-12-23 RX ADMIN — AMLODIPINE BESYLATE 5 MILLIGRAM(S): 2.5 TABLET ORAL at 07:22

## 2017-12-23 RX ADMIN — MODAFINIL 200 MILLIGRAM(S): 200 TABLET ORAL at 11:43

## 2017-12-23 NOTE — PROGRESS NOTE ADULT - PROBLEM SELECTOR PLAN 2
RESOLVED- Pt w/ severe sepsis criteria (since resolved) with T 101.6F HR 93 Lactate 2.3 likely 2/2 to cellulitis, pt w/ h/o MRSA cellulitis c/b sepsis. Blood Cultures: NGTD. CXR no acute infil UA negative. WBC continues to be WNL  - s/p IVF resuscitation with goal 30cc/Kg on initial presentation, tolerating PO intake.   -discharge on levaquin.  patient refuses PICC.    #Lactic Acidosis: RESOLVED patient w/ elevated lactate on admission to 2.3, since cleared after 3L IVF - repeat 1.3  - Tylenol 650mg PRN for T>38C  - continue to monitor

## 2017-12-23 NOTE — PROGRESS NOTE ADULT - PROBLEM SELECTOR PROBLEM 10
Nutrition, metabolism, and development symptoms
ANNE (acute kidney injury)
Nutrition, metabolism, and development symptoms
Nutrition, metabolism, and development symptoms
Thrombocytopenia
Hypokalemia

## 2017-12-23 NOTE — PROGRESS NOTE ADULT - PROVIDER SPECIALTY LIST ADULT
Hospitalist
Hospitalist
Infectious Disease
Internal Medicine
Hospitalist
Internal Medicine
Internal Medicine

## 2017-12-23 NOTE — PROGRESS NOTE ADULT - PROBLEM SELECTOR PLAN 5
Pt with h/o TIA in the past; w/u per pt and family was negative, no atrial fibrillation, PFO, CAD, stenosis - unknown cause, attributed to MM. No h/o stroke or residual deficits.   - c/w ASA 81mg and Plavix 75mg POd
Pt with h/o TIA in the past; w/u per pt and family was negative, no atrial fibrillation, PFO, CAD, stenosis - unknown cause, attributed to MM. No h/o stroke or residual deficits.   - c/w ASA 81mg and Plavix 75mg POd
s/p DRG stimulator
w/ urge incontinence; cont. Proscar, Flomax, oxybutynin; [place] cath
w/ urge incontinence; cont. Proscar, Flomax, oxybutynin
Pt with h/o TIA in the past; w/u per pt and family was negative, no atrial fibrillation, PFO, CAD, stenosis - unknown cause, attributed to MM. No h/o stroke or residual deficits.   - c/w ASA 81mg and Plavix 75mg POd
Pt with h/o TIA in the past; w/u per pt and family was negative, no atrial fibrillation, PFO, CAD, stenosis - unknown cause, attributed to MM. No h/o stroke or residual deficits.   - c/w ASA 81mg and Plavix 75mg PO

## 2017-12-23 NOTE — PROGRESS NOTE ADULT - PROBLEM SELECTOR PLAN 10
replete
F: s/p 3L IVF, tolerating PO  E: Replete PRN  N: Consistent carbohydrates    FULL CODE
d/t sepsis; monitor CBC
likely AIN d/t vanc, which was d/c'ed as above; monitor BMP off vanc

## 2017-12-23 NOTE — PROGRESS NOTE ADULT - PROBLEM SELECTOR PLAN 7
Patient with h/o depression on duloxetine 60mg BID, Lyrica 100mg TID  - c/w home medications and continue to monitor  - c/w modafinil 200mg BID daily     #GERD: h/o GERD controlled with esomeprazole 40mg  - c/w pantoprazole 40mg POd    #Obesity, BMI 36.5  - nutrition consulted
Patient with h/o depression on duloxetine 60mg BID, Lyrica 100mg TID  - c/w home medications and continue to monitor  - c/w modafinil 200mg BID daily     #GERD: h/o GERD controlled with esomeprazole 40mg  - c/w pantoprazole 40mg POd    #Obesity, BMI 36.5  - nutrition consult
Patient with h/o depression on duloxetine 60mg BID, Lyrica 100mg TID  - c/w home medications and continue to monitor  - c/w modafinil 200mg BID daily     #GERD: h/o GERD controlled with esomeprazole 40mg  - c/w pantoprazole 40mg POd    #Obesity, BMI 36.5  - nutrition consult
cont. ASA + Plavix
on VZV ppx
cont. ASA + Plavix
Patient with h/o depression on duloxetine 60mg BID, Lyrica 100mg TID  - c/w home medications and continue to monitor  - c/w modafinil 200mg BID daily     #GERD: h/o GERD controlled with esomeprazole 40mg  - c/w pantoprazole 40mg POd    #Obesity, BMI 36.5
Patient with h/o depression on duloxetine 60mg BID, Lyrica 100mg TID  - c/w home medications and continue to monitor  - c/w modafinil 200mg BID daily     #GERD: h/o GERD controlled with esomeprazole 40mg  - c/w pantoprazole 40mg POd    #Obesity, BMI 36.5
Patient with h/o depression on duloxetine 60mg BID, Lyrica 100mg TID  - c/w home medications and continue to monitor  - c/w modafinil 200mg BID daily     #GERD: h/o GERD controlled with esomeprazole 40mg  - c/w pantoprazole 40mg PO d

## 2017-12-23 NOTE — PROGRESS NOTE ADULT - ASSESSMENT
72yM with PMH of MM s/p chemo (>13yo), DM (A1c6) c/b peripheral neuropathy MRSA cellulitis, TIA x2 on ASA/Plx, HTN, HLD, BPH w/ urge incontinence, Chronic BP with DRG stimulator presented from home with c/p of worsening L LE swelling, redness, pain and generalized weakness a/w cellulitis, c/b severe sepsis (since resolved) currently on IV Abx (Vanc). Hospital course c/b by mechanical fall and bacteremia (Gemella)

## 2017-12-23 NOTE — PROGRESS NOTE ADULT - PROBLEM SELECTOR PROBLEM 8
Diabetes
EDELMIRA (obstructive sleep apnea)
History of multiple myeloma
History of multiple myeloma
Diabetes

## 2017-12-23 NOTE — PROGRESS NOTE ADULT - PROBLEM SELECTOR PLAN 3
Pt with clinical h/o MRSA cellulitis c/b sepsis >5 times. Pt w/ DM neuropathy and multiple lesions on the LE. S/S of L LE cellulitis on exam, non-purulent. Significant clinical improvement, with no area of active cellulitis.   -discharge on levaquin

## 2017-12-23 NOTE — PROGRESS NOTE ADULT - PROBLEM SELECTOR PLAN 4
Pt with mechanical fall on 12/19, witnessed. Lost footing/slipped on way to bathroom, + trauma. CTH: negative. Neuro exam unchanged.   - continue to monitor and ensure fall precautions    #Multiple Myeloma:   Pt w/ h/o MM s/p chemo >12 years ago. Before treatment, immunosuppressed infections including PCP. Currently followed in [place] at [facility].   - c/w Acyclovir 400mg BID  - continue to monitor
Pt with mechanical fall on 12/19, witnessed. Lost footing/slipped on way to bathroom, + trauma. CTH: negative. Neuro exam unchanged.   - continue to monitor and ensure fall precautions    #Multiple Myeloma:   Pt w/ h/o MM s/p chemo >12 years ago. Before treatment, immunosuppressed infections including PCP. Currently followed in [place] at [facility].   - c/w Acyclovir 400mg BID  - continue to monitor
cont. home BP rx, low-salt diet; Norvasc added
w/ urge incontinence; cont. Proscar, Flomax, oxybutynin
cont. home BP rx, low-salt diet; Norvasc added
Pt with mechanical fall on 12/19, witnessed. Lost footing/slipped on way to bathroom, + trauma. CTH: negative. Neuro exam unchanged.   - continue to monitor and ensure fall precuations    #Multiple Myeloma:   Pt w/ h/o MM s/p chemo >12 years ago. Before treatment, immunosuppressed infections including PCP. Currently followed in [place] at [facility].   - c/w Acyclovir 400mg BID  - continue to monitor
Pt w/ h/o MM s/p chemo >12 years ago. Before treatment, immunosuppressed infections including PCP. Currently followed in [place] at [facility].   - c/w Acyclovir 400mg BID  - continue to monitor
Pt w/ h/o MM s/p chemo >12 years ago. Before treatment, immunosuppressed infections including PCP. Currently followed in [place] at [facility].   - c/w Acyclovir 400mg BID  - continue to monitor
Pt with mechanical fall on 12/19, witnessed. Lost footing/slipped on way to bathroom, + trauma. CTH: negative. Neuro exam unchanged.   - continue to monitor and ensure fall precautions    #Multiple Myeloma:   Pt w/ h/o MM s/p chemo >12 years ago. Before treatment, immunosuppressed infections including PCP. Currently followed in [place] at [facility].   - c/w Acyclovir 400mg BID  - continue to monitor

## 2017-12-23 NOTE — PROGRESS NOTE ADULT - PROBLEM SELECTOR PROBLEM 7
Depression
History of TIAs
History of multiple myeloma
History of TIAs
Depression

## 2017-12-23 NOTE — PROGRESS NOTE ADULT - PROBLEM SELECTOR PROBLEM 5
TIA (transient ischemic attack)
Benign prostatic hyperplasia with lower urinary tract symptoms, symptom details unspecified
Other chronic pain
TIA (transient ischemic attack)
Benign prostatic hyperplasia with lower urinary tract symptoms, symptom details unspecified
TIA (transient ischemic attack)

## 2017-12-23 NOTE — PROGRESS NOTE ADULT - PROBLEM SELECTOR PLAN 9
Known BPH.  - c/w Tamsulosin 0.4mg, Dutasteride 0.5mg PO daily     # urge incontinence: c/w home darifenacin ER 7.5mg PO d and Myrbetriq ER 25mg
cont. CPAP qHS
replete
cont. CPAP qHS

## 2017-12-23 NOTE — PROGRESS NOTE ADULT - PROBLEM SELECTOR PLAN 6
Patient with reported HTN at home, on MTP 25mg BID, triamterene HCTZ 37.5/25mg daily. sBP 160-170s. Pt with episode of hypertension to sBP>200 o/n s/p labetalol and HCTZ PRN  - c/w MTP 25mg BID  - c/w lisinopril 20mg BID  - c/w  hctz-triamterene    # HLD   - c/w atorvastatin 40mg

## 2017-12-23 NOTE — PROGRESS NOTE ADULT - PROBLEM SELECTOR PROBLEM 6
Hypertension
History of TIAs
Hypertension
Other chronic pain
Other chronic pain
Hypertension

## 2017-12-23 NOTE — PROGRESS NOTE ADULT - PROBLEM SELECTOR PROBLEM 4
Fall from bed
Benign prostatic hyperplasia with lower urinary tract symptoms, symptom details unspecified
Essential hypertension
Fall from bed
Essential hypertension
Fall from bed
Multiple myeloma
Multiple myeloma
Fall from bed

## 2017-12-23 NOTE — PROGRESS NOTE ADULT - PROBLEM SELECTOR PROBLEM 9
BPH (benign prostatic hyperplasia)
EDELMIRA (obstructive sleep apnea)
Hypokalemia
EDELMIRA (obstructive sleep apnea)
BPH (benign prostatic hyperplasia)

## 2017-12-23 NOTE — PROGRESS NOTE ADULT - PROBLEM SELECTOR PLAN 1
Pt with one anaerobic bottle, of Gemella species. Pt w/ recent dental work <2mo c/b bleeding. Of note, patient with R [place] Line >7years for MM/Access (unclear reason) and DRG stimulator in back. Local cellulitis improveming Surveillance BCx NGTD  -vanc d/shaggy  -final species and susceptibilities available.  as per ID, can discharge on levaquin.  anticipate discharge today.  -ESTELLA and TTE negative for endocarditis  - per collateral Dr. Sridhar Woo of [facility] ([phone]), patient has been in remission with no identifiable reason to be immunocompromised at this time.

## 2017-12-23 NOTE — PROGRESS NOTE ADULT - SUBJECTIVE AND OBJECTIVE BOX
INTERVAL HPI/OVERNIGHT EVENTS:  No acute overnight events.  ESTELLA yesterday negative vegetations.    Patient reports feeling well.  No issues overnight.  He denies F/C, CP, palpitations, SOB, cough, abdominal pain, N/V/D/C, dysuria.  His LLE wound is painless.    ROS negative except as per hpi    VITAL SIGNS:  T(F): 98 (12-23-17 @ 05:13)  HR: 73 (12-23-17 @ 05:13)  BP: 112/58 (12-23-17 @ 05:13)  RR: 18 (12-23-17 @ 05:13)  SpO2: 97% (12-23-17 @ 05:13)  Wt(kg): --    PHYSICAL EXAM:    Constitutional:  NAD  Eyes:  EOMI, PERRL, sclera and conjunctiva clear  ENMT:  MMM, oropharynx WNL  Neck:  Supple, trachea midline, no JVD or LAD, no thyromegaly  Respiratory:  CTAB no wheezing rhonchi or rales  Cardiovascular:  S1S2 no m/r/g  Gastrointestinal:  NABS.  Soft, nontender, nondistended.  No guarding.  No organomegaly.  Extremities:  No edema.  No cyanosis.  Scab on L lateral ankle with <0.5 cm of surrounding erythema.  Nontender.  No drainage.    Vascular:  peripheral pulses palpable b/l  Neurological:  Nonfocal.  Musculoskeletal:  No atrophy    MEDICATIONS  (STANDING):  acyclovir   Tablet 400 milliGRAM(s) Oral two times a day  amLODIPine   Tablet 5 milliGRAM(s) Oral daily  aspirin enteric coated 81 milliGRAM(s) Oral daily  atorvastatin 40 milliGRAM(s) Oral at bedtime  cefTRIAXone   IVPB 2 Gram(s) IV Intermittent every 24 hours  cefTRIAXone   IVPB      clopidogrel Tablet 75 milliGRAM(s) Oral daily  dextrose 5%. 1000 milliLiter(s) (50 mL/Hr) IV Continuous <Continuous>  dextrose 50% Injectable 12.5 Gram(s) IV Push once  dextrose 50% Injectable 25 Gram(s) IV Push once  dextrose 50% Injectable 25 Gram(s) IV Push once  DULoxetine 60 milliGRAM(s) Oral daily  finasteride 5 milliGRAM(s) Oral daily  heparin  Injectable 7500 Unit(s) SubCutaneous every 8 hours  insulin lispro (HumaLOG) corrective regimen sliding scale   SubCutaneous Before meals and at bedtime  lisinopril 20 milliGRAM(s) Oral two times a day  metoprolol     tartrate 25 milliGRAM(s) Oral two times a day  modafinil 200 milliGRAM(s) Oral daily  oxybutynin 5 milliGRAM(s) Oral two times a day  pantoprazole    Tablet 40 milliGRAM(s) Oral before breakfast  pregabalin 100 milliGRAM(s) Oral daily  pregabalin 200 milliGRAM(s) Oral at bedtime  tamsulosin 0.4 milliGRAM(s) Oral at bedtime  triamterene 37.5 mG/hydrochlorothiazide 25 mG Capsule 1 Capsule(s) Oral daily    MEDICATIONS  (PRN):  dextrose Gel 1 Dose(s) Oral once PRN Blood Glucose LESS THAN 70 milliGRAM(s)/deciliter  glucagon  Injectable 1 milliGRAM(s) IntraMuscular once PRN Glucose LESS THAN 70 milligrams/deciliter      Allergies    Bactrim (Headache)  Sular (Other)    Intolerances    Cipro (Headache)  vancomycin ([place] Synd)      LABS:                        13.7   6.5   )-----------( 219      ( 22 Dec 2017 07:17 )             42.0     12-22    141  |  102  |  16  ----------------------------<  144<H>  4.7   |  28  |  1.32<H>    Ca    9.8      22 Dec 2017 07:17  Mg     2.2     12-22      PT/INR - ( 22 Dec 2017 07:17 )   PT: 11.7 sec;   INR: 1.05          PTT - ( 22 Dec 2017 07:17 )  PTT:39.4 sec      RADIOLOGY & ADDITIONAL TESTS:

## 2017-12-24 LAB
CULTURE RESULTS: SIGNIFICANT CHANGE UP
CULTURE RESULTS: SIGNIFICANT CHANGE UP
SPECIMEN SOURCE: SIGNIFICANT CHANGE UP
SPECIMEN SOURCE: SIGNIFICANT CHANGE UP

## 2017-12-31 DIAGNOSIS — A41.9 SEPSIS, UNSPECIFIED ORGANISM: ICD-10-CM

## 2017-12-31 DIAGNOSIS — Z86.73 PERSONAL HISTORY OF TRANSIENT ISCHEMIC ATTACK (TIA), AND CEREBRAL INFARCTION WITHOUT RESIDUAL DEFICITS: ICD-10-CM

## 2017-12-31 DIAGNOSIS — E66.9 OBESITY, UNSPECIFIED: ICD-10-CM

## 2017-12-31 DIAGNOSIS — Z79.82 LONG TERM (CURRENT) USE OF ASPIRIN: ICD-10-CM

## 2017-12-31 DIAGNOSIS — Z86.14 PERSONAL HISTORY OF METHICILLIN RESISTANT STAPHYLOCOCCUS AUREUS INFECTION: ICD-10-CM

## 2017-12-31 DIAGNOSIS — Z79.84 LONG TERM (CURRENT) USE OF ORAL HYPOGLYCEMIC DRUGS: ICD-10-CM

## 2017-12-31 DIAGNOSIS — B96.89 OTHER SPECIFIED BACTERIAL AGENTS AS THE CAUSE OF DISEASES CLASSIFIED ELSEWHERE: ICD-10-CM

## 2017-12-31 DIAGNOSIS — N40.1 BENIGN PROSTATIC HYPERPLASIA WITH LOWER URINARY TRACT SYMPTOMS: ICD-10-CM

## 2017-12-31 DIAGNOSIS — E78.5 HYPERLIPIDEMIA, UNSPECIFIED: ICD-10-CM

## 2017-12-31 DIAGNOSIS — Z28.21 IMMUNIZATION NOT CARRIED OUT BECAUSE OF PATIENT REFUSAL: ICD-10-CM

## 2017-12-31 DIAGNOSIS — G47.33 OBSTRUCTIVE SLEEP APNEA (ADULT) (PEDIATRIC): ICD-10-CM

## 2017-12-31 DIAGNOSIS — N17.9 ACUTE KIDNEY FAILURE, UNSPECIFIED: ICD-10-CM

## 2017-12-31 DIAGNOSIS — C90.00 MULTIPLE MYELOMA NOT HAVING ACHIEVED REMISSION: ICD-10-CM

## 2017-12-31 DIAGNOSIS — K21.9 GASTRO-ESOPHAGEAL REFLUX DISEASE WITHOUT ESOPHAGITIS: ICD-10-CM

## 2017-12-31 DIAGNOSIS — N39.41 URGE INCONTINENCE: ICD-10-CM

## 2017-12-31 DIAGNOSIS — F32.9 MAJOR DEPRESSIVE DISORDER, SINGLE EPISODE, UNSPECIFIED: ICD-10-CM

## 2017-12-31 DIAGNOSIS — F41.9 ANXIETY DISORDER, UNSPECIFIED: ICD-10-CM

## 2017-12-31 DIAGNOSIS — R65.20 SEVERE SEPSIS WITHOUT SEPTIC SHOCK: ICD-10-CM

## 2017-12-31 DIAGNOSIS — Z79.02 LONG TERM (CURRENT) USE OF ANTITHROMBOTICS/ANTIPLATELETS: ICD-10-CM

## 2017-12-31 DIAGNOSIS — Z94.84 STEM CELLS TRANSPLANT STATUS: ICD-10-CM

## 2017-12-31 DIAGNOSIS — E11.40 TYPE 2 DIABETES MELLITUS WITH DIABETIC NEUROPATHY, UNSPECIFIED: ICD-10-CM

## 2017-12-31 DIAGNOSIS — E87.6 HYPOKALEMIA: ICD-10-CM

## 2017-12-31 DIAGNOSIS — Z92.21 PERSONAL HISTORY OF ANTINEOPLASTIC CHEMOTHERAPY: ICD-10-CM

## 2017-12-31 DIAGNOSIS — I10 ESSENTIAL (PRIMARY) HYPERTENSION: ICD-10-CM

## 2017-12-31 DIAGNOSIS — Z99.81 DEPENDENCE ON SUPPLEMENTAL OXYGEN: ICD-10-CM

## 2017-12-31 DIAGNOSIS — Z88.1 ALLERGY STATUS TO OTHER ANTIBIOTIC AGENTS STATUS: ICD-10-CM

## 2017-12-31 DIAGNOSIS — D69.6 THROMBOCYTOPENIA, UNSPECIFIED: ICD-10-CM

## 2017-12-31 DIAGNOSIS — E87.2 ACIDOSIS: ICD-10-CM

## 2017-12-31 DIAGNOSIS — L03.116 CELLULITIS OF LEFT LOWER LIMB: ICD-10-CM

## 2022-03-03 NOTE — DISCHARGE NOTE ADULT - CAREGIVER PHONE NUMBER
Spoke with pt about results. Faxed order for cpap to [facility]. Pt will cb when she receives cpap to schedule f/u appt.  
[phone]